# Patient Record
Sex: FEMALE | Race: WHITE | NOT HISPANIC OR LATINO | Employment: FULL TIME | URBAN - METROPOLITAN AREA
[De-identification: names, ages, dates, MRNs, and addresses within clinical notes are randomized per-mention and may not be internally consistent; named-entity substitution may affect disease eponyms.]

---

## 2019-02-05 ENCOUNTER — OFFICE VISIT (OUTPATIENT)
Dept: ORTHOPEDIC SURGERY | Facility: CLINIC | Age: 44
End: 2019-02-05

## 2019-02-05 VITALS — HEART RATE: 80 BPM | BODY MASS INDEX: 44.81 KG/M2 | OXYGEN SATURATION: 98 % | HEIGHT: 65 IN | WEIGHT: 268.96 LBS

## 2019-02-05 DIAGNOSIS — M25.562 LEFT KNEE PAIN, UNSPECIFIED CHRONICITY: Primary | ICD-10-CM

## 2019-02-05 DIAGNOSIS — M17.12 PRIMARY OSTEOARTHRITIS OF LEFT KNEE: ICD-10-CM

## 2019-02-05 PROCEDURE — 20610 DRAIN/INJ JOINT/BURSA W/O US: CPT | Performed by: ORTHOPAEDIC SURGERY

## 2019-02-05 PROCEDURE — 99214 OFFICE O/P EST MOD 30 MIN: CPT | Performed by: ORTHOPAEDIC SURGERY

## 2019-02-05 RX ORDER — TRIAMCINOLONE ACETONIDE 40 MG/ML
40 INJECTION, SUSPENSION INTRA-ARTICULAR; INTRAMUSCULAR
Status: COMPLETED | OUTPATIENT
Start: 2019-02-05 | End: 2019-02-05

## 2019-02-05 RX ORDER — PROPRANOLOL HYDROCHLORIDE 80 MG/1
80 TABLET ORAL 3 TIMES DAILY
COMMUNITY
End: 2021-12-23

## 2019-02-05 RX ORDER — CITALOPRAM 10 MG/1
10 TABLET ORAL DAILY
COMMUNITY
End: 2021-06-14

## 2019-02-05 RX ORDER — LIDOCAINE HYDROCHLORIDE 10 MG/ML
3 INJECTION, SOLUTION INFILTRATION; PERINEURAL
Status: COMPLETED | OUTPATIENT
Start: 2019-02-05 | End: 2019-02-05

## 2019-02-05 RX ADMIN — TRIAMCINOLONE ACETONIDE 40 MG: 40 INJECTION, SUSPENSION INTRA-ARTICULAR; INTRAMUSCULAR at 15:42

## 2019-02-05 RX ADMIN — LIDOCAINE HYDROCHLORIDE 3 ML: 10 INJECTION, SOLUTION INFILTRATION; PERINEURAL at 15:42

## 2019-02-05 NOTE — PROGRESS NOTES
[facility] [place] Orthopaedic Surgery Clinic Note    Subjective     Pain of the Left Knee (Many year history of (L) knee pain- has increased in nature in last month- pain scale 4/10 today- mod factors- activity mod, NSAIDs, ice, elevation)      DAWSON Oseguera is a 43 y.o. female.  Patient returns the office today for her left knee.  It's been quite some time since we have seen her for the left knee.  She's complaining of medial sided knee pain and superior lateral knee pain.  It is sharp and unpredictable in nature.  No mechanical disturbance.  She has used anti-inflammatories, activity modification, ice, and elevation.  She has seen Dr. Francis in the past as well as myself.     Past Medical History:   Diagnosis Date   • Diabetes (CMS/[facility])    • Hypertension       Past Surgical History:   Procedure Laterality Date   • SHOULDER SURGERY Left       Family History   Problem Relation Age of Onset   • Stroke Mother    • Osteoarthritis Mother    • Hypertension Mother      Social History     Socioeconomic History   • Marital status:      Spouse name: Not on file   • Number of children: Not on file   • Years of education: Not on file   • Highest education level: Not on file   Social Needs   • Financial resource strain: Not on file   • Food insecurity - worry: Not on file   • Food insecurity - inability: Not on file   • Transportation needs - medical: Not on file   • Transportation needs - non-medical: Not on file   Occupational History   • Not on file   Tobacco Use   • Smoking status: Never Smoker   • Smokeless tobacco: Never Used   Substance and Sexual Activity   • Alcohol use: No     Frequency: Never   • Drug use: No   • Sexual activity: Defer   Other Topics Concern   • Not on file   Social History Narrative   • Not on file      Current Outpatient Medications on File Prior to Visit   Medication Sig Dispense Refill   • citalopram (CeleXA) 10 MG tablet Take 10 mg by mouth Daily.     • metFORMIN (GLUCOPHAGE) 1000 MG tablet  "Take 1,000 mg by mouth 2 (Two) Times a Day With Meals.     • propranolol (INDERAL) 80 MG tablet Take 80 mg by mouth 3 (Three) Times a Day.       No current facility-administered medications on file prior to visit.       No Known Allergies     The following portions of the patient's history were reviewed and updated as appropriate: allergies, current medications, past family history, past medical history, past social history, past surgical history and problem list.    Review of Systems   Constitutional: Negative.    HENT: Negative.    Eyes: Negative.    Respiratory: Negative.    Cardiovascular: Positive for leg swelling.   Gastrointestinal: Negative.    Endocrine: Negative.    Genitourinary: Negative.    Musculoskeletal: Positive for arthralgias and joint swelling.   Skin: Negative.    Allergic/Immunologic: Negative.    Neurological: Negative.    Hematological: Negative.    Psychiatric/Behavioral: Positive for decreased concentration.        Objective      Physical Exam  Pulse 80   Ht 165.1 cm (65\")   Wt 122 kg (268 lb 15.4 oz)   SpO2 98%   BMI 44.76 kg/m²     Body mass index is 44.76 kg/m².    General  Mental Status - alert  General Appearance - cooperative, well groomed, not in acute distress  Orientation - Oriented X3  Build & Nutrition - well developed and well nourished  Posture - normal posture  Gait - normal gait     Integumentary  Global Assessment  Examination of related systems reveals - no lymphadenopathy  Ears:  No abnormality  Nose:  No mucous drainage  General Characteristics  Overall examination of the patient's skin reveals - no rashes, no evidence of scars, no suspicious lesions and no bruises.  Color - normal coloration of skin.  Vascular: Brisk capillary refill in all extremities    Ortho Exam  Peripheral Vascular:    Upper Extremity:   Inspection:  Left--no cyanotic nail beds Right--no cyanotic nail beds   Bilateral:  Pink nail beds with brisk capillary refill   Palpation:  Bilateral radial " pulse normal    Musculoskeletal:  Global Assessment:  Overall assessment of Lower Extremity Muscle Strength and Tone:  Left quadriceps--5/5   Left hamstrings--5/5       Left tibialis anterior--5/5  Left gastroc-soleus--5/5  Left EHL --5/5    Lower Extremity:  Knee/Patella:  No digital clubbing or cyanosis.    Examination of left knee reveals:  Normal deep tendon reflexes, coordination, strength, tone, sensation.  No known fractures or deformities.    Inspection and Palpation:  Left knee:  Tenderness:  Over the medial joint line and moderate severity  Effusion:  1+  Crepitus:  Positive  Pulses:  2+  Ecchymosis:  None  Warmth:  None     ROM:  Right:  Extension: 5    Flexion: 120   Left:  Extension: 5     Flexion:120    Instability:    Left:  Lachman Test:  Negative, Varus stress test negative, Valgus stress test negative    Deformities/Malalignments/Discrepancies:    Left:  Genu Varum   Right:  No deformity    Functional Testing:  Aubree's test:  Negative  Patella grind test:  Positive  Q-angle:  normal    Imaging/Studies    Imaging Results (last 24 hours)     Procedure Component Value Units Date/Time    XR Knee 4+ View Left [[id number]] Resulted:  02/05/19 1524     Updated:  02/05/19 1525    Narrative:       Knee X-Ray    Indication: Pain    Study:  Upright AP, Skiers, Lateral, and Sunrise views of Left knee(s)    Comparison: None    Findings:    Patient appears to have moderate to early severe degenerative changes in   the medial compartment most clearly demonstrated on the skiers view.    There are minimal degenerative changes in the lateral compartment.  There   are moderate changes in the patellofemoral compartment.  Patient has   overall varus alignment.      Impression: Moderate medial compartment and patellofemoral compartment   osteoarthritis of the left knee.              Assessment:  1. Left knee pain, unspecified chronicity    2. Primary osteoarthritis of left knee        Plan:  1. Continue  over-the-counter medication as needed for discomfort  2. Left knee will be injected with corticosteroid today  3. Custom medial compartment offloading brace will be prescribed and fitted today  4. Follow-up in 8 weeks we'll see how she is doing overall.  With amalia, is doubtful that viscosupplementation can be approved.      Medical Decision Making  Management Options : over-the-counter medicine and prescription/IM medicine  Data/Risk: radiology tests    Rolo Vale MD  02/05/19  5:50 PM

## 2019-02-05 NOTE — PROGRESS NOTES
Procedure   Large Joint Arthrocentesis: L knee  Date/Time: 2/5/2019 3:42 PM  Consent given by: patient  Site marked: site marked  Timeout: Immediately prior to procedure a time out was called to verify the correct patient, procedure, equipment, support staff and site/side marked as required   Supporting Documentation  Indications: pain   Procedure Details  Location: knee - L knee  Preparation: Patient was prepped and draped in the usual sterile fashion  Needle size: 25 G  Approach: anterolateral  Medications administered: 3 mL lidocaine 1 %; 40 mg triamcinolone acetonide 40 MG/ML  Patient tolerance: patient tolerated the procedure well with no immediate complications

## 2019-02-12 ENCOUNTER — TELEPHONE (OUTPATIENT)
Dept: ORTHOPEDIC SURGERY | Facility: CLINIC | Age: 44
End: 2019-02-12

## 2019-02-12 NOTE — TELEPHONE ENCOUNTER
Jonathon called to let the patient know about the deductible on the custom knee brace. Patient stated that the cortisone shots were helping a bit and would wait until the next appointment.

## 2019-04-18 ENCOUNTER — TELEPHONE (OUTPATIENT)
Dept: ORTHOPEDIC SURGERY | Facility: CLINIC | Age: 44
End: 2019-04-18

## 2019-06-25 ENCOUNTER — OFFICE VISIT (OUTPATIENT)
Dept: ORTHOPEDIC SURGERY | Facility: CLINIC | Age: 44
End: 2019-06-25

## 2019-06-25 VITALS — HEIGHT: 65 IN | WEIGHT: 268.3 LBS | BODY MASS INDEX: 44.7 KG/M2 | OXYGEN SATURATION: 98 % | HEART RATE: 82 BPM

## 2019-06-25 DIAGNOSIS — G89.29 CHRONIC PAIN OF LEFT KNEE: Primary | ICD-10-CM

## 2019-06-25 DIAGNOSIS — M17.12 PRIMARY OSTEOARTHRITIS OF LEFT KNEE: ICD-10-CM

## 2019-06-25 DIAGNOSIS — M25.562 CHRONIC PAIN OF LEFT KNEE: Primary | ICD-10-CM

## 2019-06-25 PROCEDURE — 20610 DRAIN/INJ JOINT/BURSA W/O US: CPT | Performed by: PHYSICIAN ASSISTANT

## 2019-06-25 PROCEDURE — 99214 OFFICE O/P EST MOD 30 MIN: CPT | Performed by: PHYSICIAN ASSISTANT

## 2019-06-25 RX ADMIN — TRIAMCINOLONE ACETONIDE 40 MG: 40 INJECTION, SUSPENSION INTRA-ARTICULAR; INTRAMUSCULAR at 11:55

## 2019-06-25 RX ADMIN — LIDOCAINE HYDROCHLORIDE 4 ML: 10 INJECTION, SOLUTION EPIDURAL; INFILTRATION; INTRACAUDAL; PERINEURAL at 11:55

## 2019-06-25 NOTE — PROGRESS NOTES
Procedure   Large Joint Arthrocentesis: L knee  Date/Time: 6/25/2019 11:55 AM  Consent given by: patient  Site marked: site marked  Timeout: Immediately prior to procedure a time out was called to verify the correct patient, procedure, equipment, support staff and site/side marked as required   Supporting Documentation  Indications: pain   Procedure Details  Location: knee - L knee  Preparation: Patient was prepped and draped in the usual sterile fashion  Needle size: 22 G  Approach: anterolateral  Medications administered: 4 mL lidocaine PF 1% 1 %; 40 mg triamcinolone acetonide 40 MG/ML  Patient tolerance: patient tolerated the procedure well with no immediate complications

## 2019-06-25 NOTE — PROGRESS NOTES
"    [facility] [place] Orthopa[facility] Surgery Clinic Note    Subjective     CC: Follow-up (4 month follow up - Primary osteoarthritis of left knee - injection given 02/05/19)      DAWSON Oseguera is a 43 y.o. female.  Patient presents for follow-up evaluation of her left knee.  She states that the corticosteroid injection she was given when she saw Dr. Vale in February lasted for approximately 1 month and then wore off.  She is traveling to [facility] next week and would like to repeat the injection if possible for pain control during that trip.  At this time she endorses pain scale of 5/10.  Severity the pain moderate.  Quality the pain stabbing, shooting.  Associated symptoms swelling, popping, stiffness, giving away.  Symptoms are worse with any type of walking, standing, stair climbing.  She is currently using ibuprofen as needed for pain control.  Patient describes the pain as sharp to the anterior medial aspect of the knee with achiness noted laterally.  No reported numbness or tingling into the extremity.  No mechanical symptoms noted.    ROS:    Constiutional:Pt denies fever, chills, nausea, or vomiting.  MSK:as above    Objective      Past Medical History  Past Medical History:   Diagnosis Date   • Diabetes (CMS/[facility])    • Hypertension          Physical Exam  Pulse 82   Ht 165.1 cm (65\")   Wt 122 kg (268 lb 4.8 oz)   SpO2 98%   BMI 44.65 kg/m²     Body mass index is 44.65 kg/m².    Patient is well nourished and well developed.        Ortho Exam  Peripheral Vascular:    Upper Extremity:   Inspection:  Left--no cyanotic nail beds Right--no cyanotic nail beds   Bilateral:  Pink nail beds with brisk capillary refill   Palpation:  Bilateral radial pulse normal    Musculoskeletal:  Global Assessment:  Overall assessment of Lower Extremity Muscle Strength and Tone:  Left quadriceps--5/5   Left hamstrings--5/5       Left tibialis anterior--5/5  Left gastroc-soleus--5/5  Left EHL--5/5      Lower Extremity:  Knee/Patella:  " No digital clubbing or cyanosis.    Examination of left knee reveals:  Normal deep tendon reflexes, coordination, strength, tone, sensation.  No known fractures or deformities.    Inspection and Palpation:    Left knee:  Tenderness: Positive medial greater than lateral joint line tenderness.  Effusion: 1+  Crepitus: Positive  Pulses:  2+  Ecchymosis:  None  Warmth:  None       ROM:  Right:  Extension:0    Flexion:120  Left:  Extension:0     Flexion:120    Instability:    Left:  Lachman Test:  Negative, Varus stress test negative, Valgus stress test negative   Anterior Drawer Test:  Negative, Posterior Drawer Test:  Negative      Deformities/Malalignments/Discrepancies:    Left: Genu varum  Right:  Genu varum    Functional Testing:  Left:  Aubree's test:  Negative  Patella grind test: Positive      Imaging/Labs/EMG Reviewed:    Imaging Results (last 24 hours)     ** No results found for the last 24 hours. **      None today.    Assessment:  1. Chronic pain of left knee    2. Primary osteoarthritis of left knee        Plan:  1. Chronic left knee pain with known osteoarthritis.  2. We discussed the use of Visco supplementation which she is amenable to.  Preauthorization was placed today.  Pending insurance patient might have to obtain from [facility].  3. For immediate relief I did offer repeat corticosteroid injection.  Injection was given today.  4. Patient had been fitted previously for custom-made medial off  brace however due to insurance reasons she could not afford it and therefore declined.  5. Continue with over-the-counter pain medication as needed.  6. Follow-up after Visco supplementation preauthorization obtained.  7. Questions and concerns answered.    After discussing the risks, benefits, indications of injection, the patient gave consent to proceed.  Her left knee was confirmed as the correct joint to be injected with a timeout.  It was then prepped using Hibiclens and injected with a  mixture of 4 cc of 1% plain lidocaine and 1 cc of Kenalog (40 mg per mL) without any resistance through the anterior lateral approach, patient in seated position.  Area was cleaned, hemostasis was achieved and a Band-Aid was applied over the injection site.  The patient tolerated procedure well.  I instructed the patient on signs and symptoms of infection.  They should report to the emergency department or return to clinic if any of these develop, for further evaluation and treatment.  Recommended modifying activity for the next 48 hours to include rest, ice, elevation and oral pain medication as needed.  Patient was observed ambulating normally after the injection.    Medical Decision Making  Management Options : over-the-counter medicine and prescription/IM medicine      Mellisa Yoo PA-C  06/25/19  10:51 PM

## 2019-06-28 RX ORDER — TRIAMCINOLONE ACETONIDE 40 MG/ML
40 INJECTION, SUSPENSION INTRA-ARTICULAR; INTRAMUSCULAR
Status: COMPLETED | OUTPATIENT
Start: 2019-06-25 | End: 2019-06-25

## 2019-06-28 RX ORDER — LIDOCAINE HYDROCHLORIDE 10 MG/ML
4 INJECTION, SOLUTION EPIDURAL; INFILTRATION; INTRACAUDAL; PERINEURAL
Status: COMPLETED | OUTPATIENT
Start: 2019-06-25 | End: 2019-06-25

## 2021-06-14 ENCOUNTER — OFFICE VISIT (OUTPATIENT)
Dept: ORTHOPEDIC SURGERY | Facility: CLINIC | Age: 46
End: 2021-06-14

## 2021-06-14 VITALS
WEIGHT: 261 LBS | SYSTOLIC BLOOD PRESSURE: 187 MMHG | BODY MASS INDEX: 43.49 KG/M2 | HEART RATE: 75 BPM | DIASTOLIC BLOOD PRESSURE: 110 MMHG | HEIGHT: 65 IN

## 2021-06-14 DIAGNOSIS — M79.602 LEFT ARM PAIN: Primary | ICD-10-CM

## 2021-06-14 DIAGNOSIS — S56.912A MUSCLE STRAIN OF LEFT FOREARM, INITIAL ENCOUNTER: ICD-10-CM

## 2021-06-14 DIAGNOSIS — E66.01 CLASS 3 SEVERE OBESITY DUE TO EXCESS CALORIES WITHOUT SERIOUS COMORBIDITY WITH BODY MASS INDEX (BMI) OF 40.0 TO 44.9 IN ADULT (HCC): ICD-10-CM

## 2021-06-14 PROCEDURE — 99214 OFFICE O/P EST MOD 30 MIN: CPT | Performed by: PHYSICIAN ASSISTANT

## 2021-06-14 RX ORDER — CITALOPRAM 40 MG/1
40 TABLET ORAL DAILY
COMMUNITY
Start: 2021-04-02

## 2021-06-14 RX ORDER — LOSARTAN POTASSIUM 100 MG/1
100 TABLET ORAL DAILY
COMMUNITY

## 2021-06-14 NOTE — PROGRESS NOTES
[facility] [place] [facility]opa[facility] Surgery Clinic Note    Subjective     Chief Complaint   Patient presents with   • Left Arm - Initial Evaluation        HPI  Luz Oseguera is a 45 y.o. female.  Established patient presents with new issue of left dorsal forearm pain following a jerking type injury approximately 6 weeks ago.  She states she was lifting a child out of the car and had to jerk the child towards her as the door was closing resulting in pain along the dorsum of the forearm.    At this time she endorses a pain scale 6/10.  Severity the pain moderate.  Quality pain burning, stabbing.  Associated symptoms stiffness with some intermittent tingling.  Pain is worse with any type of movement of the forearm.  She is currently taking ibuprofen and Tylenol.    No reported fever, chills, night sweats or other constitutional symptoms.    Past Medical History:   Diagnosis Date   • Diabetes (CMS/HCC)    • Hypertension       Past Surgical History:   Procedure Laterality Date   • SHOULDER SURGERY Left       Family History   Problem Relation Age of Onset   • Stroke Mother    • Osteoarthritis Mother    • Hypertension Mother      Social History     Socioeconomic History   • Marital status:      Spouse name: Not on file   • Number of children: Not on file   • Years of education: Not on file   • Highest education level: Not on file   Tobacco Use   • Smoking status: Never Smoker   • Smokeless tobacco: Never Used   Substance and Sexual Activity   • Alcohol use: No   • Drug use: No   • Sexual activity: Defer      Current Outpatient Medications on File Prior to Visit   Medication Sig Dispense Refill   • citalopram (CeleXA) 40 MG tablet Take 40 mg by mouth Daily.     • losartan (COZAAR) 100 MG tablet Take 100 mg by mouth Daily.     • metFORMIN (GLUCOPHAGE) 1000 MG tablet Take 1,000 mg by mouth 2 (Two) Times a Day With Meals.     • propranolol (INDERAL) 80 MG tablet Take 80 mg by mouth 3 (Three) Times a Day.       No current  "facility-administered medications on file prior to visit.      No Known Allergies     The following portions of the patient's history were reviewed and updated as appropriate: allergies, current medications, past family history, past medical history, past social history, past surgical history and problem list.    Review of Systems   Constitutional: Negative.    HENT: Negative.    Eyes: Negative.    Respiratory: Negative.    Cardiovascular: Positive for leg swelling.   Gastrointestinal: Negative.    Endocrine: Negative.    Genitourinary: Negative.    Musculoskeletal: Positive for arthralgias and joint swelling.   Skin: Negative.    Allergic/Immunologic: Negative.    Neurological: Negative.    Hematological: Negative.    Psychiatric/Behavioral: Negative.         Objective      Physical Exam  BP (!) 187/110   Pulse 75   Ht 165.1 cm (65\")   Wt 118 kg (261 lb)   BMI 43.43 kg/m²     Body mass index is 43.43 kg/m².    GENERAL APPEARANCE: awake, alert & oriented x 3, in no acute distress and well developed, well nourished  PSYCH: normal mood and affect  LUNGS:  breathing nonlabored, no wheezing  EYES: sclera anicteric, pupils equal  CARDIOVASCULAR: palpable pulses. Capillary refill less than 2 seconds  INTEGUMENTARY: skin intact, no clubbing, cyanosis  NEUROLOGIC:  Normal Sensation         Ortho Exam  Peripheral Vascular   Left Upper Extremity    No cyanotic nail beds    Pink nail beds and rapid capillary refill   Palpation    Radial Pulse - Bilaterally normal    Neurologic   Sensory - Elbow   Inspection and Palpation:    Light touch: intact - right hand   Muscle Strength and Tone:    Left bicep - 5/5    Left tricep - 5/5    Left wrist extensors - 4+/5     Left wrist flexors - 5/5    Left intrinsics - 5/5    Musculoskeletal   Left Upper Extremity - Elbow   Inspection and Palpation     Tenderness -positive tenderness noted along dorsum of forearm.  No pain along lateral epicondyle.  No pain medially to the " elbow.    Effusion - none    Crepitus - none   Range of Motion    Flexion: AROM - 140 degrees    Extension - AROM - 0 degrees     Forearm supination: AROM - 65 degrees with increased pain    Forearm pronation - AROM - 90 degrees   Instability    Left - tennis elbow test negative   Deformities/Malalignments/Discepancies - none   Functional testing:    Valgus stress test negative    Varus stress test negative    Elbow flexion test negative    Tinel's sign at Cubital tunnel negative      Imaging/Studies  Ordered left elbow plain films.  Imaging read by Dr. Oseguera.    Imaging Results (Last 7 Days)     Procedure Component Value Units Date/Time    XR Elbow 3+ View Left [[id number]] Resulted: 06/14/21 1112     Updated: 06/14/21 1113    Narrative:      Left Elbow X-Ray  Indication: Pain  Views: AP, oblique and Lateral views    Findings:  No fracture  No bony lesion  Normal soft tissues  Normal joint spaces    No prior studies were available for comparison.              Assessment/Plan        ICD-10-CM ICD-9-CM   1. Left arm pain  M79.602 729.5   2. Muscle strain of left forearm, initial encounter  S56.912A 841.9   3. Class 3 severe obesity due to excess calories without serious comorbidity with body mass index (BMI) of 40.0 to 44.9 in adult (CMS/[facility])  E66.01 278.01    Z68.41 V85.41       Orders Placed This Encounter   Procedures   • XR Elbow 3+ View Left   • Ambulatory Referral to Physical Therapy Evaluate and treat, Ortho   • Ambulatory Referral to Bariatric Surgery        -Left forearm pain due to strain--patient was referred to formal PT to work on inflammation/pain control, range of motion, stretching and strengthening.  -Patient may continue use of ibuprofen and Tylenol for pain control.  -Obesity--patient has a BMI of 43.43.  The patient has been instructed on various weight loss avenues including diet, portion control, calorie restriction, low/no impact exercise, referral to weight loss management and/or bariatric  surgery.  It was explained that weight loss can improve joint pain alone by decreasing the joint reaction forces.  For every pound of weight change, the knee and hip joints see a 4 to 5 fold change in pressure.  Patient was provided a prescription/referral to bariatric surgery.  -Follow-up 6 weeks for repeat evaluation, sooner if issues arise or symptoms worsen/change.  Patient continues to have pain to this area then we discussed possible injection versus advanced imaging.  -Questions and concerns answered.    Case discussed with Dr. Oseguera who agrees with the above assessment and plan.      Medical Decision Making  Management Options : over-the-counter medicine and physical/occupational therapy  Data/Risk: radiology tests       Mellisa Yoo PA-C  06/17/21  13:37 EDT         EMR Dragon/Transcription disclaimer:  Much of this encounter note is an electronic transcription of spoken language to printed text. Electronic transcription of spoken language may permit erroneous, or at times, nonsensical words or phrases to be inadvertently transcribed. Although I have reviewed the note for such errors, some may still exist.

## 2021-07-28 ENCOUNTER — TELEPHONE (OUTPATIENT)
Dept: ORTHOPEDIC SURGERY | Facility: CLINIC | Age: 46
End: 2021-07-28

## 2021-12-23 ENCOUNTER — PREP FOR SURGERY (OUTPATIENT)
Dept: OTHER | Facility: HOSPITAL | Age: 46
End: 2021-12-23

## 2021-12-23 ENCOUNTER — OFFICE VISIT (OUTPATIENT)
Dept: ORTHOPEDIC SURGERY | Facility: CLINIC | Age: 46
End: 2021-12-23

## 2021-12-23 VITALS
HEIGHT: 65 IN | BODY MASS INDEX: 42.92 KG/M2 | WEIGHT: 257.6 LBS | DIASTOLIC BLOOD PRESSURE: 102 MMHG | SYSTOLIC BLOOD PRESSURE: 150 MMHG

## 2021-12-23 DIAGNOSIS — M25.562 PAIN IN BOTH KNEES, UNSPECIFIED CHRONICITY: ICD-10-CM

## 2021-12-23 DIAGNOSIS — E66.01 CLASS 3 SEVERE OBESITY DUE TO EXCESS CALORIES WITHOUT SERIOUS COMORBIDITY WITH BODY MASS INDEX (BMI) OF 40.0 TO 44.9 IN ADULT (HCC): ICD-10-CM

## 2021-12-23 DIAGNOSIS — M25.561 PAIN IN BOTH KNEES, UNSPECIFIED CHRONICITY: ICD-10-CM

## 2021-12-23 DIAGNOSIS — M17.12 PRIMARY OSTEOARTHRITIS OF LEFT KNEE: Primary | ICD-10-CM

## 2021-12-23 PROBLEM — M17.9 OA (OSTEOARTHRITIS) OF KNEE: Status: ACTIVE | Noted: 2021-12-23

## 2021-12-23 PROCEDURE — 99214 OFFICE O/P EST MOD 30 MIN: CPT | Performed by: ORTHOPAEDIC SURGERY

## 2021-12-23 RX ORDER — OXYCODONE HCL 10 MG/1
10 TABLET, FILM COATED, EXTENDED RELEASE ORAL ONCE
Status: CANCELLED | OUTPATIENT
Start: 2021-12-23 | End: 2021-12-23

## 2021-12-23 RX ORDER — ACETAMINOPHEN 500 MG
1000 TABLET ORAL ONCE
Status: CANCELLED | OUTPATIENT
Start: 2021-12-23 | End: 2021-12-23

## 2021-12-23 RX ORDER — CEFAZOLIN SODIUM IN 0.9 % NACL 3 G/100 ML
3 INTRAVENOUS SOLUTION, PIGGYBACK (ML) INTRAVENOUS ONCE
Status: CANCELLED | OUTPATIENT
Start: 2021-12-23 | End: 2021-12-23

## 2021-12-23 RX ORDER — AMLODIPINE BESYLATE 5 MG/1
5 TABLET ORAL DAILY
COMMUNITY
Start: 2021-11-24

## 2021-12-23 RX ORDER — PROPRANOLOL HYDROCHLORIDE 160 MG/1
160 CAPSULE, EXTENDED RELEASE ORAL
COMMUNITY
Start: 2021-11-24

## 2021-12-23 RX ORDER — CHLORHEXIDINE GLUCONATE 4 G/100ML
SOLUTION TOPICAL DAILY
Qty: 236 ML | Refills: 0 | Status: SHIPPED | OUTPATIENT
Start: 2021-12-23 | End: 2022-05-04 | Stop reason: HOSPADM

## 2021-12-23 NOTE — PROGRESS NOTES
"    [facility] [place] [facility] Note        Subjective     CC: Pain of the Right Knee and Follow-up (2 years follow up Primary osteoarthritis of left knee )      DAWSON Oseguera is a 46 y.o. female who presents with new problem of: right knee pain.  Onset: atraumatic and gradual in nature. The issue has been ongoing for 6 month(s). Pain is a 3/10 on the pain scale. Pain is described as sharp. Associated symptoms include pain. The pain is worse with walking; resting improve the pain. Previous treatments have included: nothing.    I have reviewed the following portions of the patient's history:History of Present Illness and review of systems.      Patient is here today for follow-up of her left knee and also for new problem day regarding her right knee.  She feels strongly that her right knee pain is being caused by compensation for her bad left knee.  She says she cannot be as active as she would like to be.  She is now an  in the school system rather than a teacher.  She has a grandchild coming in the spring.    ROS:    Constiutional:Pt denies fever, chills, nausea, or vomiting.  MSK:as above        Objective      Past Medical History  Past Medical History:   Diagnosis Date   • Diabetes (HCC)    • Hypertension          Physical Exam  BP (!) 150/102   Ht 165.1 cm (65\")   Wt 117 kg (257 lb 9.6 oz)   BMI 42.87 kg/m²     Body mass index is 42.87 kg/m².    Patient is well nourished and well developed.        Ortho Exam      Musculoskeletal:  Global Assessment:  Overall assessment of Lower Extremity Muscle Strength and Tone:  Left quadriceps--5/5   Left hamstrings--5/5       Left tibialis anterior--5/5  Left gastroc-soleus--5/5  Left EHL --5/5    Lower Extremity:    Inspection and Palpation:  Left knee:  Tenderness:  Over the medial joint line and moderate severity  Effusion:  1+  Crepitus:  Positive  Pulses:  2+  Ecchymosis:  None  Warmth:  None     ROM:  Left:  Extension: 5     " Flexion:120    Instability:    Left:    Lachman Test:  Negative   Varus stress test negative  Valgus stress test negative    Deformities/Malalignments/Discrepancies:    Left:  Genu Varum     Functional Testing:  Aubree's test:  Negative  Patella grind test:  Positive  Q-angle:  normal            Musculoskeletal:  Global Assessment:  Overall assessment of Lower Extremity Muscle Strength and Tone:  Right quadriceps--5/5   Right hamstrings--5/5       Right tibialis anterior--5/5  Right gastroc-soleus--5/5  Right EHL --5/5    Lower Extremity:    Inspection and Palpation:  Right knee:  Tenderness:  Over the medial joint line and moderate severity  Effusion:  1+  Crepitus:  Positive  Pulses:  2+  Ecchymosis:  None  Warmth:  None     ROM:  Right:  Extension: 5    Flexion:120    Instability:    Right:  Lachman Test:  Negative   Varus stress test negative   Valgus stress test negative    Deformities/Malalignments/Discrepancies:    Left:  No deformities   Right:  Genu Varum    Functional Testing:  Aubree's test:  Negative  Patella grind test:  Positive  Q-angle:  normal          Imaging/Labs/EMG Reviewed:  Imaging Results (Last 24 Hours)     Procedure Component Value Units Date/Time    XR Knee 4+ View Bilateral [[id number]] Resulted: 12/23/21 1227     Updated: 12/23/21 1229    Narrative:      Right knee X-Ray    Indication: Pain    Study:  Upright AP, Skiers, Lateral, and Sunrise views of Right knee(s)    Comparison: None    Findings:    Patient appears to have mild degenerative changes in the medial   compartment.    There are mild early moderate degenerative changes in the lateral   compartment.    There are moderate to early severe changes in the patellofemoral   compartment.    Patient has overall varus alignment.    Kellgren-Everett stGstrstastdstest:st st1st Impression:   Mild medial compartment and mild to early moderate lateral and moderate to   early severe patellofemoral compartment degnerative changes of the knee            Knee X-Ray    Indication: Pain    Study:  Upright AP, Skiers, Lateral, and Sunrise views of Left knee(s)    Comparison: Left knee 2/5/2019    Findings:    Patient appears to have severe degenerative changes in the medial   compartment.    There are mild degenerative changes in the lateral compartment.    There are moderate changes in the patellofemoral compartment.    Patient has overall varus alignment.    Kellgren-Everett thGthrthathdtheth:th th5th Impression:   Severe medial compartment and moderate patellofemoral compartment   degnerative changes of the knee             Assessment    Assessment:  1. Primary osteoarthritis of left knee    2. Class 3 severe obesity due to excess calories without serious comorbidity with body mass index (BMI) of 40.0 to 44.9 in adult (HCC)    3. Pain in both knees, unspecified chronicity        Plan:  1. Recommend over the counter anti-inflammatories for pain and/or swelling  2. Osteoarthritis right knee--radiographically, patient has mild to moderate degenerative changes only.  Relatively good joint space preservation overall.  Agree with her assessment that this is likely compensatory pain.  Observe for now.  3. End-stage left knee arthritis--we had lengthy discussion with the patient regarding treatment options and alternatives.  At this point, with her grandchild coming, she would like to be more active.  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  The risk, benefits, potential hazards, typical recovery and rehab as well as reasonable alternatives to left total knee arthroplasty were discussed with her.  Patient will be done as an outpatient.  Full dose aspirin for 6 weeks will be used for DVT prophylaxis.  Immediate outpatient PT in [place] will be arranged at her preop appointment.  See her back a few days prior to surgery.      Rolo Vale MD  12/23/21  16:55  EST      Dictated Utilizing Dragon Dictation.

## 2022-01-13 ENCOUNTER — OFFICE VISIT (OUTPATIENT)
Dept: ORTHOPEDIC SURGERY | Facility: CLINIC | Age: 47
End: 2022-01-13

## 2022-01-13 ENCOUNTER — PRE-ADMISSION TESTING (OUTPATIENT)
Dept: PREADMISSION TESTING | Facility: HOSPITAL | Age: 47
End: 2022-01-13

## 2022-01-13 VITALS
WEIGHT: 257 LBS | SYSTOLIC BLOOD PRESSURE: 142 MMHG | HEIGHT: 65 IN | BODY MASS INDEX: 42.82 KG/M2 | DIASTOLIC BLOOD PRESSURE: 82 MMHG

## 2022-01-13 DIAGNOSIS — E66.01 CLASS 3 SEVERE OBESITY DUE TO EXCESS CALORIES WITHOUT SERIOUS COMORBIDITY WITH BODY MASS INDEX (BMI) OF 40.0 TO 44.9 IN ADULT: ICD-10-CM

## 2022-01-13 DIAGNOSIS — M17.12 PRIMARY OSTEOARTHRITIS OF LEFT KNEE: Primary | ICD-10-CM

## 2022-01-13 DIAGNOSIS — M17.12 PRIMARY OSTEOARTHRITIS OF LEFT KNEE: ICD-10-CM

## 2022-01-13 LAB
ANION GAP SERPL CALCULATED.3IONS-SCNC: 10 MMOL/L (ref 5–15)
BASOPHILS # BLD AUTO: 0.02 10*3/MM3 (ref 0–0.2)
BASOPHILS NFR BLD AUTO: 0.4 % (ref 0–1.5)
BUN SERPL-MCNC: 13 MG/DL (ref 6–20)
BUN/CREAT SERPL: 17.8 (ref 7–25)
CALCIUM SPEC-SCNC: 9.8 MG/DL (ref 8.6–10.5)
CHLORIDE SERPL-SCNC: 101 MMOL/L (ref 98–107)
CO2 SERPL-SCNC: 26 MMOL/L (ref 22–29)
CREAT SERPL-MCNC: 0.73 MG/DL (ref 0.57–1)
CRP SERPL-MCNC: 0.62 MG/DL (ref 0–0.5)
DEPRECATED RDW RBC AUTO: 38.5 FL (ref 37–54)
EOSINOPHIL # BLD AUTO: 0.13 10*3/MM3 (ref 0–0.4)
EOSINOPHIL NFR BLD AUTO: 2.7 % (ref 0.3–6.2)
ERYTHROCYTE [DISTWIDTH] IN BLOOD BY AUTOMATED COUNT: 11.9 % (ref 12.3–15.4)
ERYTHROCYTE [SEDIMENTATION RATE] IN BLOOD: 30 MM/HR (ref 0–20)
GFR SERPL CREATININE-BSD FRML MDRD: 85 ML/MIN/1.73
GLUCOSE SERPL-MCNC: 234 MG/DL (ref 65–99)
HBA1C MFR BLD: 9.6 % (ref 4.8–5.6)
HCT VFR BLD AUTO: 42.6 % (ref 34–46.6)
HGB BLD-MCNC: 14.1 G/DL (ref 12–15.9)
IMM GRANULOCYTES # BLD AUTO: 0.01 10*3/MM3 (ref 0–0.05)
IMM GRANULOCYTES NFR BLD AUTO: 0.2 % (ref 0–0.5)
LYMPHOCYTES # BLD AUTO: 1.77 10*3/MM3 (ref 0.7–3.1)
LYMPHOCYTES NFR BLD AUTO: 36.5 % (ref 19.6–45.3)
MCH RBC QN AUTO: 28.8 PG (ref 26.6–33)
MCHC RBC AUTO-ENTMCNC: 33.1 G/DL (ref 31.5–35.7)
MCV RBC AUTO: 87.1 FL (ref 79–97)
MONOCYTES # BLD AUTO: 0.41 10*3/MM3 (ref 0.1–0.9)
MONOCYTES NFR BLD AUTO: 8.5 % (ref 5–12)
NEUTROPHILS NFR BLD AUTO: 2.51 10*3/MM3 (ref 1.7–7)
NEUTROPHILS NFR BLD AUTO: 51.7 % (ref 42.7–76)
NRBC BLD AUTO-RTO: 0 /100 WBC (ref 0–0.2)
PLATELET # BLD AUTO: 259 10*3/MM3 (ref 140–450)
PMV BLD AUTO: 10 FL (ref 6–12)
POTASSIUM SERPL-SCNC: 4.3 MMOL/L (ref 3.5–5.2)
QT INTERVAL: 388 MS
QTC INTERVAL: 412 MS
RBC # BLD AUTO: 4.89 10*6/MM3 (ref 3.77–5.28)
SODIUM SERPL-SCNC: 137 MMOL/L (ref 136–145)
WBC NRBC COR # BLD: 4.85 10*3/MM3 (ref 3.4–10.8)

## 2022-01-13 PROCEDURE — S0260 H&P FOR SURGERY: HCPCS | Performed by: ORTHOPAEDIC SURGERY

## 2022-01-13 PROCEDURE — 80048 BASIC METABOLIC PNL TOTAL CA: CPT

## 2022-01-13 PROCEDURE — 86140 C-REACTIVE PROTEIN: CPT

## 2022-01-13 PROCEDURE — 36415 COLL VENOUS BLD VENIPUNCTURE: CPT

## 2022-01-13 PROCEDURE — 85025 COMPLETE CBC W/AUTO DIFF WBC: CPT

## 2022-01-13 PROCEDURE — 93010 ELECTROCARDIOGRAM REPORT: CPT | Performed by: INTERNAL MEDICINE

## 2022-01-13 PROCEDURE — 83036 HEMOGLOBIN GLYCOSYLATED A1C: CPT

## 2022-01-13 PROCEDURE — 85652 RBC SED RATE AUTOMATED: CPT

## 2022-01-13 PROCEDURE — 93005 ELECTROCARDIOGRAM TRACING: CPT

## 2022-01-13 NOTE — PROGRESS NOTES
"    [facility] [place]        Subjective     CC: Pre-op Exam (Primary osteoarthritis of left knee, Left TKA 1/26/22)      DAWSON Oseguera is a 46 y.o. female.  Patient is here for her preop appointment for left total knee scheduled tentatively for 1/26/2022.  She is eager to get this done.  She is flexible with regards to scheduling.    Overall, patient's symptoms are as above.    ROS:    Constiutional:Pt denies fever, chills, nausea, or vomiting.  MSK:as above        Objective      Past Medical History  Past Medical History:   Diagnosis Date   • Diabetes (HCC)    • Hypertension          Physical Exam  /82   Ht 165.1 cm (65\")   Wt 117 kg (257 lb)   BMI 42.77 kg/m²     Body mass index is 42.77 kg/m².    Patient is well nourished and well developed.        Ortho Exam      Musculoskeletal:  Global Assessment:  Overall assessment of Lower Extremity Muscle Strength and Tone:  Left quadriceps--5/5   Left hamstrings--5/5       Left tibialis anterior--5/5  Left gastroc-soleus--5/5  Left EHL --5/5    Lower Extremity:    Inspection and Palpation:  Left knee:  Tenderness:  Over the medial joint line and moderate severity  Effusion:  1+  Crepitus:  Positive  Pulses:  2+  Ecchymosis:  None  Warmth:  None     ROM:  Left:  Extension: 5     Flexion:120    Instability:    Left:    Lachman Test:  Negative   Varus stress test negative  Valgus stress test negative    Deformities/Malalignments/Discrepancies:    Left:  Genu Varum     Functional Testing:  Aubree's test:  Negative  Patella grind test:  Positive  Q-angle:  normal      Imaging/Labs/EMG Reviewed:  Imaging Results (Last 24 Hours)     ** No results found for the last 24 hours. **        Preop labs are pending which include CBC, BMP, hemoglobin A1c, sed rate and CRP.    Assessment    Assessment:  1. Primary osteoarthritis of left knee    2. Class 3 severe obesity due to excess calories without serious comorbidity with body mass index (BMI) of " 40.0 to 44.9 in adult (HCC)        Plan:  1. Recommend over the counter anti-inflammatories for pain and/or swelling  2. Severe left knee arthritis--pending successful completion of her labs and review, she would be an excellent candidate for arthroplasty.  We will try to get this done as an outpatient.  PT prescription written for her today.  Full dose aspirin daily for 6 weeks for DVT prophylaxis after surgery will be recommended.      Rolo Vale MD  01/13/22  17:49 EST      Dictated Utilizing Dragon Dictation.

## 2022-01-17 ENCOUNTER — APPOINTMENT (OUTPATIENT)
Dept: PREADMISSION TESTING | Facility: HOSPITAL | Age: 47
End: 2022-01-17

## 2022-01-23 ENCOUNTER — APPOINTMENT (OUTPATIENT)
Dept: PREADMISSION TESTING | Facility: HOSPITAL | Age: 47
End: 2022-01-23

## 2022-04-21 ENCOUNTER — PRE-ADMISSION TESTING (OUTPATIENT)
Dept: PREADMISSION TESTING | Facility: HOSPITAL | Age: 47
End: 2022-04-21

## 2022-04-21 ENCOUNTER — OFFICE VISIT (OUTPATIENT)
Dept: ORTHOPEDIC SURGERY | Facility: CLINIC | Age: 47
End: 2022-04-21

## 2022-04-21 VITALS — BODY MASS INDEX: 43.4 KG/M2 | WEIGHT: 260.47 LBS | HEIGHT: 65 IN

## 2022-04-21 VITALS — BODY MASS INDEX: 42.78 KG/M2 | HEIGHT: 65 IN | WEIGHT: 256.8 LBS

## 2022-04-21 DIAGNOSIS — E66.01 CLASS 3 SEVERE OBESITY DUE TO EXCESS CALORIES WITHOUT SERIOUS COMORBIDITY WITH BODY MASS INDEX (BMI) OF 40.0 TO 44.9 IN ADULT: ICD-10-CM

## 2022-04-21 DIAGNOSIS — R73.09 ELEVATED HEMOGLOBIN A1C: ICD-10-CM

## 2022-04-21 DIAGNOSIS — M17.12 PRIMARY OSTEOARTHRITIS OF LEFT KNEE: ICD-10-CM

## 2022-04-21 DIAGNOSIS — M17.12 PRIMARY OSTEOARTHRITIS OF LEFT KNEE: Primary | ICD-10-CM

## 2022-04-21 LAB
DEPRECATED RDW RBC AUTO: 39.7 FL (ref 37–54)
ERYTHROCYTE [DISTWIDTH] IN BLOOD BY AUTOMATED COUNT: 12.6 % (ref 12.3–15.4)
HBA1C MFR BLD: 6.6 % (ref 4.8–5.6)
HCT VFR BLD AUTO: 38.4 % (ref 34–46.6)
HGB BLD-MCNC: 13.1 G/DL (ref 12–15.9)
MCH RBC QN AUTO: 29.4 PG (ref 26.6–33)
MCHC RBC AUTO-ENTMCNC: 34.1 G/DL (ref 31.5–35.7)
MCV RBC AUTO: 86.3 FL (ref 79–97)
PLATELET # BLD AUTO: 273 10*3/MM3 (ref 140–450)
PMV BLD AUTO: 10 FL (ref 6–12)
POTASSIUM SERPL-SCNC: 4.3 MMOL/L (ref 3.5–5.2)
RBC # BLD AUTO: 4.45 10*6/MM3 (ref 3.77–5.28)
WBC NRBC COR # BLD: 7.24 10*3/MM3 (ref 3.4–10.8)

## 2022-04-21 PROCEDURE — 36415 COLL VENOUS BLD VENIPUNCTURE: CPT

## 2022-04-21 PROCEDURE — 84132 ASSAY OF SERUM POTASSIUM: CPT

## 2022-04-21 PROCEDURE — 85027 COMPLETE CBC AUTOMATED: CPT

## 2022-04-21 PROCEDURE — 83036 HEMOGLOBIN GLYCOSYLATED A1C: CPT

## 2022-04-21 PROCEDURE — S0260 H&P FOR SURGERY: HCPCS | Performed by: ORTHOPAEDIC SURGERY

## 2022-04-21 RX ORDER — PROCHLORPERAZINE 25 MG/1
SUPPOSITORY RECTAL
COMMUNITY
Start: 2022-02-11

## 2022-04-21 RX ORDER — GLIPIZIDE 5 MG/1
5 TABLET, FILM COATED, EXTENDED RELEASE ORAL DAILY
COMMUNITY
Start: 2022-03-14

## 2022-04-21 RX ORDER — PROCHLORPERAZINE 25 MG/1
SUPPOSITORY RECTAL DAILY
COMMUNITY
Start: 2022-02-14

## 2022-04-21 ASSESSMENT — KOOS JR
KOOS JR SCORE: 21
KOOS JR SCORE: 34.174

## 2022-04-21 NOTE — PROGRESS NOTES
"    [facility]        Subjective     CC: Pre-op Exam (Pre-op visit - Total knee Arthroplasty, Left 05/04 )      DAWSON Oseguera is a 46 y.o. female.  Patient returns the office today.  In the interim, she has gotten a Dexcom device and change medications and as a result her hemoglobin A1c is now less than 7.5.  She is eager to get her left knee replaced.    Overall, patient's symptoms are as above.    ROS:    Constiutional:Pt denies fever, chills, nausea, or vomiting.  MSK:as above        Objective      Past Medical History  Past Medical History:   Diagnosis Date   • Arthritis of neck April 2022    Diagnosed by chiropractor   • Diabetes (HCC)    • Hypertension    • Knee swelling Over 5 years ago         Physical Exam  Ht 165.1 cm (65\")   Wt 116 kg (256 lb 12.8 oz)   BMI 42.73 kg/m²     Body mass index is 42.73 kg/m².    Patient is well nourished and well developed.        Ortho Exam      Musculoskeletal:  Global Assessment:  Overall assessment of Lower Extremity Muscle Strength and Tone:  Left quadriceps--5/5   Left hamstrings--5/5       Left tibialis anterior--5/5  Left gastroc-soleus--5/5  Left EHL --5/5    Lower Extremity:    Inspection and Palpation:  Left knee:  Tenderness:  Over the medial joint line and moderate severity  Effusion:  1+  Crepitus:  Positive  Pulses:  2+  Ecchymosis:  None  Warmth:  None     ROM:  Left:  Extension: 5     Flexion:120    Instability:    Left:    Lachman Test:  Negative   Varus stress test negative  Valgus stress test negative    Deformities/Malalignments/Discrepancies:    Left:  Genu Varum     Functional Testing:  Aubree's test:  Negative  Patella grind test:  Positive  Q-angle:  normal      Imaging/Labs/EMG Reviewed:  Imaging Results (Last 24 Hours)     ** No results found for the last 24 hours. **            Assessment    Assessment:  1. Primary osteoarthritis of left knee    2. Class 3 severe obesity due to excess calories without serious " comorbidity with body mass index (BMI) of 40.0 to 44.9 in adult (HCC)    3. Elevated hemoglobin A1c        Plan:  1. Recommend over the counter anti-inflammatories for pain and/or swelling  2. Left knee arthritis--plan will be to provide her a PT prescription.  She is can have the surgery as an outpatient.  She will [facility].  We will use full dose aspirin daily for 6 weeks for DVT prophylaxis.  The risk, benefits, potential hazards of left total knee arthroplasty were discussed with her.  She had the opportunity ask questions and is eager to proceed with scheduling we will get this done early May.      Rolo Vale MD  04/21/22  17:24 EDT      Dictated Utilizing Dragon Dictation.

## 2022-04-21 NOTE — PAT
Discussed with patient options for receiving total joint replacement education and assessed patient's ability and preference. Joint Replacement Guide given to patient during PAT visit since not received a copy within the last year. Encouraged patient/family to read guide thoroughly and notify PAT staff with any questions or concerns. Handout provided directing patient to links to watch online videos related to joint replacement surgery on the [facility] website. The handout gives detailed instructions for joining an online joint replacement class through Zoom or phone conference offered on . Patient agreed to participate by watching videos online. Patient verbalized understanding of instructions and to complete the online learning tool survey. Encouraged to share information with family and/or . An overview of the joint replacement education was provided during the visit including general perioperative instructions that are routine for all surgical patients (PAT PASS, wipes, directions to pre-op, etc.).    Per Anesthesia Request, patient instructed not to take their ACE/ARB medications on the AM of surgery.    Bactroban and Chlorhexidine Prescription prescribed by physician before PAT visit.  Verified with patient that medications were picked up from their pharmacy.  Written instructions given to patient during PAT visit.  Patient/family also instructed to complete skin prep checklist and return the checklist on the day of surgery to preoperative staff.  Patient/family verbalized understanding.    Patient instructed to drink 20 ounces (or until full) of Gatorade and it needs to be completed 1 hour (for Main OR patients) or 2 hours (scheduled  section patients) before given arrival time for procedure (NO RED Gatorade)    Patient verbalized understanding.    Patient to apply Chlorhexadine wipes  to surgical area (as instructed) the night before procedure and the AM of procedure. Wipes  provided.    [facility]    EKG on chart from 1/13/22.

## 2022-05-02 ENCOUNTER — APPOINTMENT (OUTPATIENT)
Dept: PREADMISSION TESTING | Facility: HOSPITAL | Age: 47
End: 2022-05-02

## 2022-05-02 LAB — SARS-COV-2 RNA PNL SPEC NAA+PROBE: NOT DETECTED

## 2022-05-02 PROCEDURE — C9803 HOPD COVID-19 SPEC COLLECT: HCPCS

## 2022-05-02 PROCEDURE — U0004 COV-19 TEST NON-CDC HGH THRU: HCPCS

## 2022-05-03 ENCOUNTER — ANESTHESIA EVENT (OUTPATIENT)
Dept: PERIOP | Facility: HOSPITAL | Age: 47
End: 2022-05-03

## 2022-05-03 RX ORDER — FAMOTIDINE 10 MG/ML
20 INJECTION, SOLUTION INTRAVENOUS ONCE
Status: CANCELLED | OUTPATIENT
Start: 2022-05-03 | End: 2022-05-03

## 2022-05-04 ENCOUNTER — APPOINTMENT (OUTPATIENT)
Dept: GENERAL RADIOLOGY | Facility: HOSPITAL | Age: 47
End: 2022-05-04

## 2022-05-04 ENCOUNTER — ANESTHESIA EVENT CONVERTED (OUTPATIENT)
Dept: ANESTHESIOLOGY | Facility: HOSPITAL | Age: 47
End: 2022-05-04

## 2022-05-04 ENCOUNTER — HOSPITAL ENCOUNTER (OUTPATIENT)
Facility: HOSPITAL | Age: 47
Discharge: HOME | End: 2022-05-04
Attending: ORTHOPAEDIC SURGERY | Admitting: ORTHOPAEDIC SURGERY

## 2022-05-04 ENCOUNTER — ANESTHESIA (OUTPATIENT)
Dept: PERIOP | Facility: HOSPITAL | Age: 47
End: 2022-05-04

## 2022-05-04 VITALS
HEIGHT: 65 IN | TEMPERATURE: 98.2 F | OXYGEN SATURATION: 92 % | SYSTOLIC BLOOD PRESSURE: 108 MMHG | HEART RATE: 65 BPM | WEIGHT: 260.47 LBS | DIASTOLIC BLOOD PRESSURE: 71 MMHG | BODY MASS INDEX: 43.4 KG/M2 | RESPIRATION RATE: 17 BRPM

## 2022-05-04 DIAGNOSIS — M17.12 PRIMARY OSTEOARTHRITIS OF LEFT KNEE: ICD-10-CM

## 2022-05-04 LAB
B-HCG UR QL: NEGATIVE
EXPIRATION DATE: NORMAL
GLUCOSE BLDC GLUCOMTR-MCNC: 144 MG/DL (ref 70–130)
INTERNAL NEGATIVE CONTROL: NORMAL
INTERNAL POSITIVE CONTROL: NORMAL
Lab: NORMAL

## 2022-05-04 PROCEDURE — 25010000002 PROPOFOL 10 MG/ML EMULSION: Performed by: NURSE ANESTHETIST, CERTIFIED REGISTERED

## 2022-05-04 PROCEDURE — 97116 GAIT TRAINING THERAPY: CPT

## 2022-05-04 PROCEDURE — 27447 TOTAL KNEE ARTHROPLASTY: CPT | Performed by: ORTHOPAEDIC SURGERY

## 2022-05-04 PROCEDURE — 97161 PT EVAL LOW COMPLEX 20 MIN: CPT

## 2022-05-04 PROCEDURE — 25010000002 ROPIVACAINE PER 1 MG: Performed by: NURSE ANESTHETIST, CERTIFIED REGISTERED

## 2022-05-04 PROCEDURE — 73560 X-RAY EXAM OF KNEE 1 OR 2: CPT

## 2022-05-04 PROCEDURE — C1755 CATHETER, INTRASPINAL: HCPCS | Performed by: ORTHOPAEDIC SURGERY

## 2022-05-04 PROCEDURE — 25010000002 DEXAMETHASONE PER 1 MG: Performed by: NURSE ANESTHETIST, CERTIFIED REGISTERED

## 2022-05-04 PROCEDURE — 81025 URINE PREGNANCY TEST: CPT | Performed by: ANESTHESIOLOGY

## 2022-05-04 PROCEDURE — 25010000002 ROPIVACAINE PER 1 MG: Performed by: ORTHOPAEDIC SURGERY

## 2022-05-04 PROCEDURE — 82962 GLUCOSE BLOOD TEST: CPT

## 2022-05-04 PROCEDURE — 25010000002 KETOROLAC TROMETHAMINE PER 15 MG: Performed by: ORTHOPAEDIC SURGERY

## 2022-05-04 PROCEDURE — 27447 TOTAL KNEE ARTHROPLASTY: CPT | Performed by: PHYSICIAN ASSISTANT

## 2022-05-04 PROCEDURE — 25010000002 MORPHINE PER 10 MG: Performed by: ORTHOPAEDIC SURGERY

## 2022-05-04 PROCEDURE — C1776 JOINT DEVICE (IMPLANTABLE): HCPCS | Performed by: ORTHOPAEDIC SURGERY

## 2022-05-04 PROCEDURE — 25010000002 ONDANSETRON PER 1 MG: Performed by: NURSE ANESTHETIST, CERTIFIED REGISTERED

## 2022-05-04 PROCEDURE — 25010000002 CEFAZOLIN PER 500 MG: Performed by: NURSE ANESTHETIST, CERTIFIED REGISTERED

## 2022-05-04 DEVICE — ART/SRF KN PERSONA/VE PS EF 6TO7 11MM LT: Type: IMPLANTABLE DEVICE | Site: KNEE | Status: FUNCTIONAL

## 2022-05-04 DEVICE — IMPLANTABLE DEVICE: Type: IMPLANTABLE DEVICE | Site: KNEE | Status: FUNCTIONAL

## 2022-05-04 DEVICE — STEM TIB/KN PERSONA TRABECULAR 2PEG SZE LT: Type: IMPLANTABLE DEVICE | Site: KNEE | Status: FUNCTIONAL

## 2022-05-04 DEVICE — DEV CONTRL TISS STRATAFIX SYMM PDS PLUS VIL CT-1 45CM: Type: IMPLANTABLE DEVICE | Site: KNEE | Status: FUNCTIONAL

## 2022-05-04 DEVICE — SCRW HEX PERSONA FML 2.5X25MM PK/2: Type: IMPLANTABLE DEVICE | Site: KNEE | Status: FUNCTIONAL

## 2022-05-04 DEVICE — PAT NXGN POR 10X32MM: Type: IMPLANTABLE DEVICE | Site: KNEE | Status: FUNCTIONAL

## 2022-05-04 DEVICE — CAP TOTL KN POROUS/HYBRID PRIMARY: Type: IMPLANTABLE DEVICE | Status: FUNCTIONAL

## 2022-05-04 RX ORDER — CEFAZOLIN SODIUM 2 G/100ML
2 INJECTION, SOLUTION INTRAVENOUS ONCE
Status: DISCONTINUED | OUTPATIENT
Start: 2022-05-04 | End: 2022-05-04 | Stop reason: ALTCHOICE

## 2022-05-04 RX ORDER — MEPERIDINE HYDROCHLORIDE 25 MG/ML
12.5 INJECTION INTRAMUSCULAR; INTRAVENOUS; SUBCUTANEOUS
Status: DISCONTINUED | OUTPATIENT
Start: 2022-05-04 | End: 2022-05-04 | Stop reason: HOSPADM

## 2022-05-04 RX ORDER — EPHEDRINE SULFATE 50 MG/ML
5 INJECTION, SOLUTION INTRAVENOUS ONCE AS NEEDED
Status: DISCONTINUED | OUTPATIENT
Start: 2022-05-04 | End: 2022-05-04 | Stop reason: HOSPADM

## 2022-05-04 RX ORDER — PROPOFOL 10 MG/ML
VIAL (ML) INTRAVENOUS AS NEEDED
Status: DISCONTINUED | OUTPATIENT
Start: 2022-05-04 | End: 2022-05-04 | Stop reason: SURG

## 2022-05-04 RX ORDER — ASPIRIN 325 MG
325 TABLET, DELAYED RELEASE (ENTERIC COATED) ORAL DAILY
Status: CANCELLED | OUTPATIENT
Start: 2022-05-05

## 2022-05-04 RX ORDER — CEFAZOLIN SODIUM 1 G/3ML
INJECTION, POWDER, FOR SOLUTION INTRAMUSCULAR; INTRAVENOUS AS NEEDED
Status: DISCONTINUED | OUTPATIENT
Start: 2022-05-04 | End: 2022-05-04 | Stop reason: SURG

## 2022-05-04 RX ORDER — ONDANSETRON 2 MG/ML
4 INJECTION INTRAMUSCULAR; INTRAVENOUS ONCE AS NEEDED
Status: DISCONTINUED | OUTPATIENT
Start: 2022-05-04 | End: 2022-05-04 | Stop reason: HOSPADM

## 2022-05-04 RX ORDER — MIDAZOLAM HYDROCHLORIDE 1 MG/ML
1 INJECTION INTRAMUSCULAR; INTRAVENOUS
Status: DISCONTINUED | OUTPATIENT
Start: 2022-05-04 | End: 2022-05-04 | Stop reason: HOSPADM

## 2022-05-04 RX ORDER — CEFAZOLIN SODIUM IN 0.9 % NACL 3 G/100 ML
3 INTRAVENOUS SOLUTION, PIGGYBACK (ML) INTRAVENOUS ONCE
Status: DISCONTINUED | OUTPATIENT
Start: 2022-05-04 | End: 2022-05-04 | Stop reason: HOSPADM

## 2022-05-04 RX ORDER — OXYCODONE HCL 10 MG/1
10 TABLET, FILM COATED, EXTENDED RELEASE ORAL ONCE
Status: COMPLETED | OUTPATIENT
Start: 2022-05-04 | End: 2022-05-04

## 2022-05-04 RX ORDER — MELOXICAM 7.5 MG/1
7.5 TABLET ORAL DAILY
Qty: 30 TABLET | Refills: 0 | Status: SHIPPED | OUTPATIENT
Start: 2022-05-04 | End: 2022-06-21

## 2022-05-04 RX ORDER — HYDROMORPHONE HYDROCHLORIDE 1 MG/ML
0.5 INJECTION, SOLUTION INTRAMUSCULAR; INTRAVENOUS; SUBCUTANEOUS
Status: DISCONTINUED | OUTPATIENT
Start: 2022-05-04 | End: 2022-05-04 | Stop reason: HOSPADM

## 2022-05-04 RX ORDER — ONDANSETRON 4 MG/1
4 TABLET, FILM COATED ORAL EVERY 8 HOURS PRN
Qty: 20 TABLET | Refills: 1 | Status: SHIPPED | OUTPATIENT
Start: 2022-05-04

## 2022-05-04 RX ORDER — CEFAZOLIN SODIUM 2 G/100ML
2 INJECTION, SOLUTION INTRAVENOUS EVERY 8 HOURS
Status: DISCONTINUED | OUTPATIENT
Start: 2022-05-04 | End: 2022-05-04 | Stop reason: HOSPADM

## 2022-05-04 RX ORDER — NALOXONE HCL 0.4 MG/ML
0.4 VIAL (ML) INJECTION AS NEEDED
Status: DISCONTINUED | OUTPATIENT
Start: 2022-05-04 | End: 2022-05-04 | Stop reason: HOSPADM

## 2022-05-04 RX ORDER — FENTANYL CITRATE 50 UG/ML
50 INJECTION, SOLUTION INTRAMUSCULAR; INTRAVENOUS
Status: DISCONTINUED | OUTPATIENT
Start: 2022-05-04 | End: 2022-05-04 | Stop reason: HOSPADM

## 2022-05-04 RX ORDER — SODIUM CHLORIDE 0.9 % (FLUSH) 0.9 %
10 SYRINGE (ML) INJECTION EVERY 12 HOURS SCHEDULED
Status: DISCONTINUED | OUTPATIENT
Start: 2022-05-04 | End: 2022-05-04 | Stop reason: HOSPADM

## 2022-05-04 RX ORDER — SODIUM CHLORIDE, SODIUM LACTATE, POTASSIUM CHLORIDE, CALCIUM CHLORIDE 600; 310; 30; 20 MG/100ML; MG/100ML; MG/100ML; MG/100ML
9 INJECTION, SOLUTION INTRAVENOUS CONTINUOUS
Status: DISCONTINUED | OUTPATIENT
Start: 2022-05-04 | End: 2022-05-04 | Stop reason: HOSPADM

## 2022-05-04 RX ORDER — LIDOCAINE HYDROCHLORIDE 10 MG/ML
INJECTION, SOLUTION EPIDURAL; INFILTRATION; INTRACAUDAL; PERINEURAL AS NEEDED
Status: DISCONTINUED | OUTPATIENT
Start: 2022-05-04 | End: 2022-05-04 | Stop reason: SURG

## 2022-05-04 RX ORDER — TRANEXAMIC ACID 10 MG/ML
1000 INJECTION, SOLUTION INTRAVENOUS ONCE
Status: COMPLETED | OUTPATIENT
Start: 2022-05-04 | End: 2022-05-04

## 2022-05-04 RX ORDER — ACETAMINOPHEN 325 MG/1
650 TABLET ORAL EVERY 6 HOURS PRN
Qty: 60 TABLET | Refills: 0 | Status: SHIPPED | OUTPATIENT
Start: 2022-05-04

## 2022-05-04 RX ORDER — LIDOCAINE HYDROCHLORIDE 10 MG/ML
0.5 INJECTION, SOLUTION EPIDURAL; INFILTRATION; INTRACAUDAL; PERINEURAL ONCE AS NEEDED
Status: COMPLETED | OUTPATIENT
Start: 2022-05-04 | End: 2022-05-04

## 2022-05-04 RX ORDER — MAGNESIUM HYDROXIDE 1200 MG/15ML
LIQUID ORAL AS NEEDED
Status: DISCONTINUED | OUTPATIENT
Start: 2022-05-04 | End: 2022-05-04 | Stop reason: HOSPADM

## 2022-05-04 RX ORDER — OXYCODONE HYDROCHLORIDE 5 MG/1
5 TABLET ORAL EVERY 4 HOURS PRN
Qty: 30 TABLET | Refills: 0 | Status: SHIPPED | OUTPATIENT
Start: 2022-05-04 | End: 2022-06-21

## 2022-05-04 RX ORDER — BUPIVACAINE HCL/0.9 % NACL/PF 0.125 %
PLASTIC BAG, INJECTION (ML) EPIDURAL AS NEEDED
Status: DISCONTINUED | OUTPATIENT
Start: 2022-05-04 | End: 2022-05-04 | Stop reason: SURG

## 2022-05-04 RX ORDER — SODIUM CHLORIDE, SODIUM LACTATE, POTASSIUM CHLORIDE, CALCIUM CHLORIDE 600; 310; 30; 20 MG/100ML; MG/100ML; MG/100ML; MG/100ML
100 INJECTION, SOLUTION INTRAVENOUS CONTINUOUS
Status: DISCONTINUED | OUTPATIENT
Start: 2022-05-04 | End: 2022-05-04 | Stop reason: HOSPADM

## 2022-05-04 RX ORDER — ONDANSETRON 2 MG/ML
INJECTION INTRAMUSCULAR; INTRAVENOUS AS NEEDED
Status: DISCONTINUED | OUTPATIENT
Start: 2022-05-04 | End: 2022-05-04 | Stop reason: SURG

## 2022-05-04 RX ORDER — IBUPROFEN 600 MG/1
600 TABLET ORAL EVERY 6 HOURS PRN
Status: DISCONTINUED | OUTPATIENT
Start: 2022-05-04 | End: 2022-05-04 | Stop reason: HOSPADM

## 2022-05-04 RX ORDER — FAMOTIDINE 20 MG/1
20 TABLET, FILM COATED ORAL ONCE
Status: COMPLETED | OUTPATIENT
Start: 2022-05-04 | End: 2022-05-04

## 2022-05-04 RX ORDER — ACETAMINOPHEN 500 MG
1000 TABLET ORAL ONCE
Status: COMPLETED | OUTPATIENT
Start: 2022-05-04 | End: 2022-05-04

## 2022-05-04 RX ORDER — SODIUM CHLORIDE 0.9 % (FLUSH) 0.9 %
10 SYRINGE (ML) INJECTION AS NEEDED
Status: DISCONTINUED | OUTPATIENT
Start: 2022-05-04 | End: 2022-05-04 | Stop reason: HOSPADM

## 2022-05-04 RX ORDER — DEXAMETHASONE SODIUM PHOSPHATE 4 MG/ML
INJECTION, SOLUTION INTRA-ARTICULAR; INTRALESIONAL; INTRAMUSCULAR; INTRAVENOUS; SOFT TISSUE AS NEEDED
Status: DISCONTINUED | OUTPATIENT
Start: 2022-05-04 | End: 2022-05-04 | Stop reason: SURG

## 2022-05-04 RX ORDER — ASPIRIN 325 MG
325 TABLET ORAL DAILY
Qty: 42 TABLET | Refills: 0 | Status: SHIPPED | OUTPATIENT
Start: 2022-05-04

## 2022-05-04 RX ORDER — DOCUSATE SODIUM 100 MG/1
100 CAPSULE, LIQUID FILLED ORAL 2 TIMES DAILY
Qty: 60 CAPSULE | Refills: 1 | Status: SHIPPED | OUTPATIENT
Start: 2022-05-04 | End: 2022-06-21

## 2022-05-04 RX ORDER — BUPIVACAINE HYDROCHLORIDE 2.5 MG/ML
INJECTION, SOLUTION EPIDURAL; INFILTRATION; INTRACAUDAL
Status: COMPLETED | OUTPATIENT
Start: 2022-05-04 | End: 2022-05-04

## 2022-05-04 RX ORDER — OXYCODONE HYDROCHLORIDE AND ACETAMINOPHEN 5; 325 MG/1; MG/1
1 TABLET ORAL ONCE AS NEEDED
Status: DISCONTINUED | OUTPATIENT
Start: 2022-05-04 | End: 2022-05-04 | Stop reason: HOSPADM

## 2022-05-04 RX ORDER — ONDANSETRON 4 MG/1
4 TABLET, FILM COATED ORAL ONCE AS NEEDED
Status: DISCONTINUED | OUTPATIENT
Start: 2022-05-04 | End: 2022-05-04 | Stop reason: HOSPADM

## 2022-05-04 RX ORDER — BUPIVACAINE HYDROCHLORIDE 5 MG/ML
INJECTION, SOLUTION PERINEURAL
Status: COMPLETED | OUTPATIENT
Start: 2022-05-04 | End: 2022-05-04

## 2022-05-04 RX ADMIN — ONDANSETRON 4 MG: 2 INJECTION INTRAMUSCULAR; INTRAVENOUS at 12:53

## 2022-05-04 RX ADMIN — PROPOFOL 50 MG: 10 INJECTION, EMULSION INTRAVENOUS at 11:35

## 2022-05-04 RX ADMIN — BUPIVACAINE HYDROCHLORIDE 2 ML: 5 INJECTION, SOLUTION PERINEURAL at 10:57

## 2022-05-04 RX ADMIN — CEFAZOLIN SODIUM 3 G: 1 INJECTION, POWDER, FOR SOLUTION INTRAMUSCULAR; INTRAVENOUS at 10:50

## 2022-05-04 RX ADMIN — OXYCODONE HYDROCHLORIDE 10 MG: 10 TABLET, FILM COATED, EXTENDED RELEASE ORAL at 09:36

## 2022-05-04 RX ADMIN — TRANEXAMIC ACID 1000 MG: 10 INJECTION, SOLUTION INTRAVENOUS at 12:40

## 2022-05-04 RX ADMIN — FAMOTIDINE 20 MG: 20 TABLET ORAL at 09:36

## 2022-05-04 RX ADMIN — Medication 50 MCG: at 12:00

## 2022-05-04 RX ADMIN — TRANEXAMIC ACID 1000 MG: 10 INJECTION, SOLUTION INTRAVENOUS at 11:01

## 2022-05-04 RX ADMIN — PROPOFOL 50 MG: 10 INJECTION, EMULSION INTRAVENOUS at 10:54

## 2022-05-04 RX ADMIN — SODIUM CHLORIDE, POTASSIUM CHLORIDE, SODIUM LACTATE AND CALCIUM CHLORIDE: 600; 310; 30; 20 INJECTION, SOLUTION INTRAVENOUS at 12:47

## 2022-05-04 RX ADMIN — PROPOFOL 75 MCG/KG/MIN: 10 INJECTION, EMULSION INTRAVENOUS at 11:03

## 2022-05-04 RX ADMIN — DEXAMETHASONE SODIUM PHOSPHATE 4 MG: 4 INJECTION, SOLUTION INTRA-ARTICULAR; INTRALESIONAL; INTRAMUSCULAR; INTRAVENOUS; SOFT TISSUE at 11:50

## 2022-05-04 RX ADMIN — LIDOCAINE HYDROCHLORIDE 30 MG: 10 INJECTION, SOLUTION EPIDURAL; INFILTRATION; INTRACAUDAL; PERINEURAL at 10:54

## 2022-05-04 RX ADMIN — ACETAMINOPHEN 1000 MG: 500 TABLET ORAL at 09:36

## 2022-05-04 RX ADMIN — BUPIVACAINE HYDROCHLORIDE 30 ML: 2.5 INJECTION, SOLUTION EPIDURAL; INFILTRATION; INTRACAUDAL; PERINEURAL at 13:38

## 2022-05-04 RX ADMIN — ROPIVACAINE HYDROCHLORIDE 10 ML/HR: 5 INJECTION, SOLUTION EPIDURAL; INFILTRATION; PERINEURAL at 14:10

## 2022-05-04 RX ADMIN — MUPIROCIN 1 APPLICATION: 20 OINTMENT TOPICAL at 09:36

## 2022-05-04 RX ADMIN — SODIUM CHLORIDE, POTASSIUM CHLORIDE, SODIUM LACTATE AND CALCIUM CHLORIDE 9 ML/HR: 600; 310; 30; 20 INJECTION, SOLUTION INTRAVENOUS at 09:36

## 2022-05-04 RX ADMIN — Medication 100 MCG: at 12:12

## 2022-05-04 RX ADMIN — LIDOCAINE HYDROCHLORIDE 0.5 ML: 10 INJECTION, SOLUTION EPIDURAL; INFILTRATION; INTRACAUDAL; PERINEURAL at 09:36

## 2022-05-04 NOTE — INTERVAL H&P NOTE
"Pre-Op H&P (See Recent Office Note Attached for Full H&P)    Chief complaint: Left knee pain    HPI:      Patient is a 46 y.o. female who presents with a history of left knee pain. She has been diagnosed with primary osteoarthritis of the left knee. Conservative treatment has failed to provide significant relief. Surgical intervention is recommended and she is agreeable. She is here today for a left total knee arthroplasty.    Review of Systems:  General ROS:  no fever, chills, rashes.  No change since last office visit.  No recent sick exposure  Cardiovascular ROS: no chest pain or dyspnea on exertion; +HTN  Respiratory ROS: no cough, shortness of breath, or wheezing  Endocrine ROS: +DM      Meds:    No current facility-administered medications on file prior to encounter.     Current Outpatient Medications on File Prior to Encounter   Medication Sig Dispense Refill    amLODIPine (NORVASC) 5 MG tablet Take 5 mg by mouth Daily.      chlorhexidine (HIBICLENS) 4 % external liquid Apply  topically to the appropriate area as directed Daily. Shower w/ solution for 5 days before surgery. Bring to [facility] to be filled. 236 mL 0    citalopram (CeleXA) 40 MG tablet Take 40 mg by mouth Daily.      losartan (COZAAR) 100 MG tablet Take 100 mg by mouth Daily.      mupirocin (BACTROBAN) 2 % ointment 1 application into the nostril(s) as directed by provider 2 (Two) Times a Day. Apply to each nostril twice daily for 5 days before surgery. 22 g 0    propranolol LA (INDERAL LA) 160 MG 24 hr capsule Take 160 mg by mouth Daily.         Vital Signs:  /77 (BP Location: Right arm, Patient Position: Lying)   Pulse 55   Temp 97.7 °F (36.5 °C) (Temporal)   Resp 16   Ht 165.1 cm (65\")   Wt 118 kg (260 lb 7.6 oz)   LMP 04/29/2022   SpO2 97%   BMI 43.35 kg/m²     Physical Exam:    CV:  S1S2 regular rate and rhythm, no murmur               Resp:  Clear to auscultation; respirations regular, even and unlabored    Results Review: "     Lab Results   Component Value Date    WBC 7.24 04/21/2022    HGB 13.1 04/21/2022    HCT 38.4 04/21/2022    MCV 86.3 04/21/2022     04/21/2022    NEUTROABS 2.51 01/13/2022    GLUCOSE 234 (H) 01/13/2022    BUN 13 01/13/2022    CREATININE 0.73 01/13/2022    EGFRIFNONA 85 01/13/2022     01/13/2022    K 4.3 04/21/2022     01/13/2022    CO2 26.0 01/13/2022    CALCIUM 9.8 01/13/2022        I reviewed the patient's new clinical results.    Cancer Staging (if applicable)  Cancer Patient: __ yes _X_no __unknown; If yes, clinical stage T:__ N:__M:__, stage group or __N/A    Assessment: Primary osteoarthritis of the left knee    Plan: Left total knee arthroplasty      Yvonne Means, LISSETTE   5/4/2022   09:38 EDT

## 2022-05-04 NOTE — ANESTHESIA PROCEDURE NOTES
Peripheral Block      Patient reassessed immediately prior to procedure    Patient location during procedure: post-op  Reason for block: at surgeon's request and post-op pain management  Performed by  CRNA/CAA: Latricia Cheng CRNA  Assisted by: Desi Tony RNSRNA: Radha Aaron SRNA  Preanesthetic Checklist  Completed: patient identified, IV checked, site marked, risks and benefits discussed, surgical consent, monitors and equipment checked, pre-op evaluation and timeout performed  Prep:  Pt Position: supine  Sterile barriers:cap, gloves, mask and sterile barriers  Prep: ChloraPrep  Patient monitoring: blood pressure monitoring, continuous pulse oximetry and EKG  Procedure    Sedation: no  Performed under: spinal  Guidance:ultrasound guided  Images:still images obtained, printed/placed on chart    Laterality:left  Block Type:adductor canal block  Injection Technique:catheter  Needle Type:Tuohy and echogenic  Needle Gauge:18 G  Resistance on Injection: none  Catheter Size:20 G (20g)  Cath Depth at skin: 10 cm    Medications Used: bupivacaine PF (MARCAINE) 0.25 % injection, 30 mL      Post Assessment  Injection Assessment: negative aspiration for heme, incremental injection and no paresthesia on injection  Patient Tolerance:comfortable throughout block  Complications:no  Additional Notes  Procedure:             The pt was placed in the Supine position.  The Insertion site was  prepped and Draped in sterile fashion.  The pt was anesthetized with  IV Sedation( see meds).  Skin and cutaneous tissue was infiltrated and anesthetized with 1% Lidocaine 3 mls via a 25g needle.  A BBraun 4 inch 18g echogenic needle was then  inserted approximately midline, mid-thigh and advanced In-plane with Ultrasound guidance.  Normal Saline PSF was utilized for hydrodissection of tissue.  The Vastus medialis and Sartorius muscle where visualized and the needle tip was placed in the adductor canal,  lateral to the femoral  artery.  LA injection spread was visualized, injection was incremental 1-5ml, injection pressure was normal or little, no intraneural injection, no vascular injection.  LA dose was injected thru the needle(see dose above).  A BBraun 20g wire stylet catheter was placed via the needle with ultrasound visualization and confirmation with NS fluid bolus. The catheter insertion site was sealed with exofin tissue adhesive. The labeled catheter was then coiled and secured to skin with benzoin,  steristrips and CHG transparent dressing.  Appropriate labels were applied.  Thank you.

## 2022-05-04 NOTE — THERAPY EVALUATION
Patient Name: Luz Oseguera  : 1975    MRN: [id number]                              Today's Date: 2022       Admit Date: 2022    Visit Dx:     ICD-10-CM ICD-9-CM   1. Primary osteoarthritis of left knee  M17.12 715.16     Patient Active Problem List   Diagnosis   • OA (osteoarthritis) of knee     Past Medical History:   Diagnosis Date   • Arthritis of neck 2022    Diagnosed by chiropractor   • Diabetes (HCC)    • Hypertension    • Knee swelling Over 5 years ago     Past Surgical History:   Procedure Laterality Date   • SHOULDER SURGERY Left       General Information     Row Name 22 1600          Physical Therapy Time and Intention    Document Type evaluation  -TRUMAN     Mode of Treatment individual therapy;physical therapy  -TRUMAN     Row Name 22 1600          General Information    Patient Profile Reviewed yes  -TRUMAN     Prior Level of Function min assist:;all household mobility;transfer;bed mobility;ADL's  -TRUMAN     Existing Precautions/Restrictions fall;other (see comments)  L adductor nerve cath  -TRUMAN     Barriers to Rehab none identified  -TRUMAN     Row Name 22 1600          Living Environment    People in Home spouse  -TRUMAN     Row Name 22 1600          Home Main Entrance    Number of Stairs, Main Entrance three  -TRUMAN     Stair Railings, Main Entrance none  -TRUMAN     Row Name 22 1600          Stairs Within Home, Primary    Stairs, Within Home, Primary 0  -TRUMAN     Number of Stairs, Within Home, Primary none  -TRUMAN     Row Name 22 1600          Cognition    Orientation Status (Cognition) oriented x 4  -TRUMAN     Row Name 22 1600          Safety Issues, Functional Mobility    Safety Issues Affecting Function (Mobility) safety precaution awareness;safety precautions follow-through/compliance  -TRUMAN     Impairments Affecting Function (Mobility) endurance/activity tolerance;strength;pain;range of motion (ROM)  -TRUMAN           User Key  (r) = Recorded By, (t) = Taken By, (c) =  Cosigned By    Initials Name Provider Type    TRUMAN Sachin Collado, PT Physical Therapist               Mobility     Row Name 05/04/22 1600          Bed Mobility    Bed Mobility scooting/bridging;supine-sit;sit-supine  -TRUMAN     Scooting/Bridging [place] (Bed Mobility) supervision;verbal cues  -TRUMAN     Supine-Sit [place] (Bed Mobility) supervision;verbal cues  -TRUMAN     Sit-Supine [place] (Bed Mobility) verbal cues;supervision  -TRUMAN     Assistive Device (Bed Mobility) bed rails;head of bed elevated  -TRUMAN     Comment, (Bed Mobility) Verbal cues for LE sequencing off of/onto EOB and trunk control into sitting/supine  -TRUMAN     Row Name 05/04/22 1600          Transfers    Comment, (Transfers) CGA for toilet transfer  -TRUMAN     Row Name 05/04/22 1600          Sit-Stand Transfer    Sit-Stand [place] (Transfers) verbal cues;contact guard  -TRUMAN     Assistive Device (Sit-Stand Transfers) walker, front-wheeled  -TRUMAN     Comment, (Sit-Stand Transfer) Verbal cues for safe hand placement during standing/sitting and moving L LE out for comfort prior to sitting  -TRUMAN     Row Name 05/04/22 1600          Gait/Stairs (Locomotion)    [place] Level (Gait) verbal cues;contact guard;1 person to manage equipment  -TRUMAN     Assistive Device (Gait) walker, front-wheeled  -TRUMAN     Distance in Feet (Gait) 350  -TRUMAN     Deviations/Abnormal Patterns (Gait) bilateral deviations;ashley decreased;gait speed decreased;weight shifting decreased  -TRUMAN     Bilateral Gait Deviations forward flexed posture  -TRUMAN     Left Sided Gait Deviations heel strike decreased;weight shift ability decreased  -TRUMAN     [place] Level (Stairs) verbal cues;contact guard  -TRUMAN     Assistive Device (Stairs) cane, straight;other (see comments)  STC on the R and L HHA  -TRUMAN     Handrail Location (Stairs) none  -TRUMAN     Number of Steps (Stairs) 3  -TRUMAN     Ascending Technique (Stairs) step-to-step  -TRUMAN     Descending Technique (Stairs) step-to-step  -TRUMAN     Comment,  (Gait/Stairs) Pt ambulated with step through pattern and decreased speed. Verbal cues for maintaining upright posture, body within walker, and increase step length. Pt ascended/descended 3 steps using STC on the R, L HHA, and CGA. Gait/stair training limited by fatigue. No knee buckling noted.  -[facility] Name 05/04/22 1600          Mobility    Extremity Weight-bearing Status left lower extremity  -     Left Lower Extremity (Weight-bearing Status) weight-bearing as tolerated (WBAT)  -           User Key  (r) = Recorded By, (t) = Taken By, (c) = Cosigned By    Initials Name Provider Type    Sachin King, PT Physical Therapist               Obj/Interventions     [facility] Name 05/04/22 1600          Range of Motion Comprehensive    General Range of Motion lower extremity range of motion deficits identified  -     Comment, General Range of Motion R LE AROM WFL; L knee AROM measured at 4-82 degrees; able to actively DF/PF  -[facility] Name 05/04/22 1600          Strength Comprehensive (MMT)    General Manual Muscle Testing (MMT) Assessment lower extremity strength deficits identified  -     Comment, General Manual Muscle Testing (MMT) Assessment R LE functionally 4+/5; L LE functionally 4-/5; IND with SLR  -[facility] Name 05/04/22 1600          Motor Skills    Therapeutic Exercise hip;knee;ankle;other (see comments)  heel raises 3x  -[facility] Name 05/04/22 1600          Hip (Therapeutic Exercise)    Hip (Therapeutic Exercise) isometric exercises  -     Hip Isometrics (Therapeutic Exercise) gluteal sets;10 repetitions  -TRUMAN     Row Name 05/04/22 1600          Knee (Therapeutic Exercise)    Knee (Therapeutic Exercise) isometric exercises;strengthening exercise  -     Knee Isometrics (Therapeutic Exercise) quad sets;10 repetitions  -     Knee Strengthening (Therapeutic Exercise) left;heel slides;SLR (straight leg raise);SAQ (short arc quad);LAQ (long arc quad);3 repetitions  -[facility] Name 05/04/22 1600           Ankle (Therapeutic Exercise)    Ankle (Therapeutic Exercise) AROM (active range of motion)  -TRUMAN     Ankle AROM (Therapeutic Exercise) bilateral;dorsiflexion;plantarflexion;10 repetitions  -TRUMAN     Row Name 05/04/22 1600          Sensory Assessment (Somatosensory)    Sensory Assessment (Somatosensory) LE sensation intact  -TRUMAN           User Key  (r) = Recorded By, (t) = Taken By, (c) = Cosigned By    Initials Name Provider Type    TRUMAN Scahin Collado, PT Physical Therapist               Goals/Plan     Row Name 05/04/22 1600          Bed Mobility Goal 1 (PT)    Activity/Assistive Device (Bed Mobility Goal 1, PT) sit to supine/supine to sit  -TRUMAN     [place] Level/Cues Needed (Bed Mobility Goal 1, PT) modified independence  -TRUMAN     Time Frame (Bed Mobility Goal 1, PT) long term goal (LTG);3 days  -TRUMAN     Row Name 05/04/22 1600          Transfer Goal 1 (PT)    Activity/Assistive Device (Transfer Goal 1, PT) sit-to-stand/stand-to-sit;walker, rolling  -TRUMAN     [place] Level/Cues Needed (Transfer Goal 1, PT) modified independence  -TRUAMN     Time Frame (Transfer Goal 1, PT) long term goal (LTG);3 days  -TRUMAN     Row Name 05/04/22 1600          Gait Training Goal 1 (PT)    Activity/Assistive Device (Gait Training Goal 1, PT) gait (walking locomotion);walker, rolling  -TRUMAN     [place] Level (Gait Training Goal 1, PT) modified independence  -TRUMAN     Distance (Gait Training Goal 1, PT) 500 feet  -TRUMAN     Time Frame (Gait Training Goal 1, PT) long term goal (LTG);3 days  -TRUMAN     Row Name 05/04/22 1600          ROM Goal 1 (PT)    ROM Goal 1 (PT) L knee AROM 0-90 degrees  -TRUMAN     Time Frame (ROM Goal 1, PT) long-term goal (LTG);3 days  -TRUMAN     Row Name 05/04/22 1600          Stairs Goal 1 (PT)    Activity/Assistive Device (Stairs Goal 1, PT) stairs, all skills;cane, straight  -TRUMAN     [place] Level/Cues Needed (Stairs Goal 1, PT) modified independence  -TRUMAN     Number of Stairs (Stairs Goal 1, PT) 3  -TRUMAN     Time Frame  (Stairs Goal 1, PT) long term goal (LTG);3 days  -     Row Name 05/04/22 1600          Therapy Assessment/Plan (PT)    Planned Therapy Interventions (PT) balance training;bed mobility training;gait training;home exercise program;patient/family education;transfer training;stair training;strengthening;ROM (range of motion)  -           User Key  (r) = Recorded By, (t) = Taken By, (c) = Cosigned By    Initials Name Provider Type    Sachin King, ERIN Physical Therapist               Clinical Impression     Row Name 05/04/22 1600          Pain    Pretreatment Pain Rating 0/10 - no pain  -TRUMAN     Posttreatment Pain Rating 2/10  -TRUMAN     Pain Location - Side/Orientation Left  -TRUMAN     Pain Location posterior  -TRUMAN     Pain Location - knee  -TRUMAN     Pain Intervention(s) Ambulation/increased activity;Repositioned;Cold applied  -     Row Name 05/04/22 1600          Therapy Assessment/Plan (PT)    Patient/Family Therapy Goals Statement (PT) To return home  -     Rehab Potential (PT) good, to achieve stated therapy goals  -     Criteria for Skilled Interventions Met (PT) yes;meets criteria;skilled treatment is necessary  -     Therapy Frequency (PT) 2 times/day  -     Row Name 05/04/22 1600          Positioning and Restraints    Pre-Treatment Position in bed  -TRUMAN     Post Treatment Position bed  -TRUMAN     In Bed notified nsg;fowlers;call light within reach;encouraged to call for assist;with nsg  -TRUMAN           User Key  (r) = Recorded By, (t) = Taken By, (c) = Cosigned By    Initials Name Provider Type    Sachin King, PT Physical Therapist               Outcome Measures     Row Name 05/04/22 1600          How much help from another person do you currently need...    Turning from your back to your side while in flat bed without using bedrails? 4  -TRUMAN     Moving from lying on back to sitting on the side of a flat bed without bedrails? 4  -TRUMAN     Moving to and from a bed to a chair (including a wheelchair)? 3  -TRUMAN      Standing up from a chair using your arms (e.g., wheelchair, bedside chair)? 3  -TRUMAN     Climbing 3-5 steps with a railing? 3  -TRUMAN     To walk in hospital room? 3  -TRUMAN     AM-PAC 6 Clicks Score (PT) 20  -     Highest level of mobility 6 --> Walked 10 steps or more  -     Row Name 05/04/22 1600          PADD    Diagnosis 1  -TRUMAN     Gender 1  -TRUMAN     Age Group 1  -TRUMAN     Gait Distance 1  -TRUMAN     Assist Level 1  -TRUMAN     Home Support 3  -TRUMAN     PADD Score 9  -TRUMAN     Patient Preference home with outpatient rehab  -     Prediction by PADD Score directly home (with home health or out-patient rehab)  -     Row Name 05/04/22 1600          Functional Assessment    Outcome Measure Options AM-PAC 6 Clicks Basic Mobility (PT);PADD  -           User Key  (r) = Recorded By, (t) = Taken By, (c) = Cosigned By    Initials Name Provider Type    Sachin King, PT Physical Therapist                             Physical Therapy Education                 Title: PT OT SLP Therapies (Done)     Topic: Physical Therapy (Done)     Point: Mobility training (Done)     Learning Progress Summary           Patient Acceptance, E,D,H, VU by  at 5/4/2022 1600    Comment: Educated on safe sequencing with bed mobility, ambulatory/car/toilet transfers, gait, and stair training. Reviewed HEP and knee precautions via handout.                   Point: Home exercise program (Done)     Learning Progress Summary           Patient Acceptance, E,D,H, VU by  at 5/4/2022 1600    Comment: Educated on safe sequencing with bed mobility, ambulatory/car/toilet transfers, gait, and stair training. Reviewed HEP and knee precautions via handout.                   Point: Body mechanics (Done)     Learning Progress Summary           Patient Acceptance, E,D,H, VU by  at 5/4/2022 1600    Comment: Educated on safe sequencing with bed mobility, ambulatory/car/toilet transfers, gait, and stair training. Reviewed HEP and knee precautions via handout.                    Point: Precautions (Done)     Learning Progress Summary           Patient Acceptance, E,D,H, VU by  at 5/4/2022 1600    Comment: Educated on safe sequencing with bed mobility, ambulatory/car/toilet transfers, gait, and stair training. Reviewed HEP and knee precautions via handout.                               User Key     Initials Effective Dates Name Provider Type Discipline     06/16/21 -  Sachin Collado, PT Physical Therapist PT              PT Recommendation and Plan  Planned Therapy Interventions (PT): balance training, bed mobility training, gait training, home exercise program, patient/family education, transfer training, stair training, strengthening, ROM (range of motion)  Plan of Care Reviewed With: patient  Progress: improving  Outcome Evaluation: PT eval complete. Pt ambulated 350 feet using RW, CGA, and one person to manage equipment. Pt ascended/descended 3 steps using STC on the R, L HHA, and CGA. Gait/stair training limited by fatigue. Bed mobility performed with supervision. STS and toilet transfers with CGA. No knee buckling noted. Pt IND with SLR. L knee AROM 4-82 degrees. Reviewed HEP and knee precautions via handout. Educated on safe car transfers. PADD score = 9. ADLs assessed, pt does not require OT eval tonight. Functionally, pt safe to d/c home with assist today from a PT perspective. Recommend OPPT.     Time Calculation:    PT Charges     Row Name 05/04/22 1600             Time Calculation    Start Time 1600  -TRUMAN      PT Received On 05/04/22  -      PT Goal Re-Cert Due Date 05/14/22  -              Time Calculation- PT    Total Timed Code Minutes- PT 20 minute(s)  -TRUMAN              Timed Charges    16970 - PT Therapeutic Exercise Minutes 6  -TRUMAN      65299 - Gait Training Minutes  10  -      04455 - PT Therapeutic Activity Minutes 4  -TRUMAN              Untimed Charges    PT Eval/Re-eval Minutes 35  -TRUMAN              Total Minutes    Timed Charges Total Minutes 20  -TRUMAN      Untimed  Charges Total Minutes 35  -TRUMAN       Total Minutes 55  -TRUMAN            User Key  (r) = Recorded By, (t) = Taken By, (c) = Cosigned By    Initials Name Provider Type    Sachin King, PT Physical Therapist              Therapy Charges for Today     Code Description Service Date Service Provider Modifiers Qty    [id number]  GAIT TRAINING EA 15 MIN 5/4/2022 Sachin Collado, PT GP 1    [id number]  PT EVAL LOW COMPLEXITY 3 5/4/2022 Sachin Collado, PT GP 1    [id number]  PT THER SUPP EA 15 MIN 5/4/2022 Sachin Collado, PT GP 3          PT G-Codes  Outcome Measure Options: AM-PAC 6 Clicks Basic Mobility (PT), PADD  AM-PAC 6 Clicks Score (PT): 20    Sachin Collado, PT  5/4/2022

## 2022-05-04 NOTE — OP NOTE
Orthopaedics Operative Report    PREOPERATIVE DIAGNOSIS: Primary osteoarthritis left knee    POSTOPERATIVE DIAGNOSIS: Same    PROCEDURE PERFORMED: Left total knee arthroplasty, CPT 62064    SURGEON: Rolo Vale MD    ANESTHESIA:  Choice    STAFF:  Circulator: Shaun Dukes RN; Kraig Mejia RN  Scrub Person: Linn Kirkpatrick; Irineo Roy  Vendor Representative: Gavin Green  Nursing Assistant: Lisa Zabala PCT; Robert, Paige  Assistant: Mellisa Yoo PA-C    TOURNIQUET TIME: approx 75 min    ESTIMATED BLOOD LOSS: 50 mL    COMPLICATIONS: None apparent.    RELEASES:None required after bone cuts made and osteophytes removed    TXA:IV    IMPLANTS:     Implant Name Type Inv. Item Serial No.  Lot No. LRB No. Used Action   DEV CONTRL TISS STRATAFIX SYMM PDS PLUS CHARLENE CT-1 45CM - [id number] Implant DEV CONTRL TISS STRATAFIX SYMM PDS PLUS CHARLENE CT-1 45CM  [employer]  DIV [employer] J [employer] J [id number] Left 1 Implanted   SCRW HEX PERSONA FML 2.5X25MM PK/2 - [id number] Implant SCRW HEX PERSONA FML 2.5X25MM PK/2  [employer] [id number] Left 1 Implanted   PAT NXGN POR 60G91NP - [id number] Implant PAT NXGN POR [id number]  [employer] [id number] Left 1 Implanted   COMP FEM/KN PERSONA CR POR COCR NRW SZ7 LT - [id number] Implant COMP FEM/KN PERSONA CR POR COCR NRW SZ7 LT  [employer] [id number] Left 1 Implanted   STEM TIB PERSONA TRABECULAR 2PEG JHONNY LT - [id number] Implant STEM TIB PERSONA TRABECULAR 2PEG JHONNY LT  [employer] [id number] Left 1 Implanted   ART/SRF KN PERSONA/VE PS EF 6TO7 11MM LT - [id number] Implant ART/SRF KN PERSONA/VE PS EF 6TO7 11MM LT  [employer] [id number] Left 1 Implanted       Tk Persona CR TM femur size 7 narrow   size E TM tibia  32 TM patella button   Size 11 Vitamin E Medial Congruent highly crosslinked polyethylene articular surface    PREOPERATIVE ANTIBIOTICS: Kefzol    REFERRING PHYSICIAN: Lucina England MD    INDICATIONS: Failure of nonoperative treatment including injections,  bracing, and activity modification.    DESCRIPTION OF PROCEDURE: After informed consent was obtained, the patient was taken to the operating room. The patient was given a dose of IV antibiotics prior to incision.After the smooth induction of spinal anesthesia, the patient’s left lower extremity was prepped and draped in the usual fashion for this type of procedure. We performed a timeout to verify site and the procedure to be performed.  We began with exsanguination of the left lower extremity using an Esmarch bandage and inflation of tourniquet to 300 mmHg. We made our standard midline incision and medial parapatellar approach. We took 20% of the quadriceps tendon medially and a sleeve of tissue around the patella for repair as well a sliver of patellar tendon. Dissected extra-ostially but subperiosteally on the medial side taking off the superficial and deep MCL from the proximal tibia. These were protected throughout the procedure. Visible medial meniscus was excised. The retropatellar fat pad was excised. The ACL and visible lateral meniscus was excised as well as the synovium from the anterior surface of the distal femur.  Osteophytes were removed from the distal femur and proximal tibia at this point.        We then everted the patella and this was resurfaced, restoring patellar height. The patellar height was 25 mm preoperatively and we cut the patella to 15 mm and sized the patella to a 32.  The lugholes were drilled and the component slightly medialized.       We then turned our attention to preparation of the femur. We placed our intramedullary distal femoral guide into place set on 5 degrees of valgus and due to preoperative flexion contracture, we took an extra 2 mm of bone off of the distal femur. The distal femur was cut protecting medial and lateral structures.  We were vigilant about making sure the distal femoral cut was flat.  We then marked Magi's line and sized our femur to be a size 7. We  marked 3° of external rotation which correlated well with Clifton's line. We then secured this block into place and made our anterior, posterior, and chamfer cuts.  Again, we were vigilant about the cut flatness to make sure that the bone was appropriate for press-fit.  It was noted that the bone was excellent quality overall.  The pins were removed and the bone cuts were completed and freshened up. We then excised our posterior cruciate ligament and exposed the proximal tibia. We took 10 mm of bone off the lateral side. We protected medial and lateral structures during our cut as well as the patellar tendon. We completed the cut. We sized the tibia to be a size E ensuring that we had cortical contact on the medial lateral aspects of the tibial component. We then removed meniscus from the back of the knee. We used our flexion extension blocks to make sure our flexion and extension gaps were acceptable.  We checked alignment at this point as well.  We checked the appropriateness of the tibial bone for press-fit and it was felt to be appropriate and excellent overall.  We then placed the cutting guide back on to the pins to ensure that the cut was flat.  Once this was ensured, we then completed the preparation of the tibia, aiming the center of the baseplate at the medial third of the tibial tubercle.      We then completed the preparation of our femoral component.  We then trialed and were stable on the medial side at 0°, 30°, 60°, 90° and 120°. The lug holes were then drilled.  We then implanted these components into place starting with the tibial component first.  We controlled the implantation to make sure that we were parallel with the tibial cut throughout implantation.  Once we ensured that the component was fully seated, we moved our attention to the femoral component.  We used our femoral component handle and impactor and  impacted the femoral component without difficulty into place.  Once we ensured that  again the femoral component was fully seated, we then moved our attention to the patella component and this was clamped into place without difficulty.  We then trialed and a size 11 polyethylene spacer had good stability and full range of motion.  We then thoroughly irrigated the knee at that point and injected our posterior capsule with our local anesthetic consisting of 20 mL of normal saline, 20 mL of half percent lidocaine with epi, 20 mL of half percent bupivacaine, 30 mg of Toradol, and 8 mg of morphine.  We then deflated the tourniquet prior to placement of our final polyethylene spacer. Any bleeding seen in the back of the knee was controlled. The final spacer was then secured into place. We then used a final irrigation consisting of Irricept, diluted betadine and normal saline throughout the knee.  We then closed our subcutaneous layer using a 0 strata fix, running 2-0 Vicryl and interrupted 2-0 Vicryl. We closed our skin using a running 3-0 Monocryl. We placed an Exofin Fusion dressing system over the incision and took down the drapes. The patient was transferred back to their hospital bed and then taken to the recovery room in stable condition. All counts were correct postoperatively. I performed the case.     First assistant: Mellisa Yoo PA-C     The skilled assistance of the above noted first assistant was necessary during this complex surgical procedure.  The surgical assistant assisted with every aspect of the operation including, but not limited to, proper and safe positioning of the patient, obtaining adequate surgical exposure, manipulation of surgical instruments to make the proper bone cuts, cementing of the final implants, the continual process of hemostasis during the procedure itself in addition to surgical wound closure and removal of the patient from the operating table and returning the patient back to the [facility].  The assistance of the surgical assistant allowed me to  perform the most sensitive and technical potions of this operation using 2 hands, thus enhancing efficiency and patient safety.  This would not be possible without the help of a skilled assistant familiar with the procedure and capable of safely performing the aforementioned tasks.         POSTOPERATIVE PLAN:  1. Weight bearing as tolerated left lower extremity.  2. The patient will receive an indwelling low femoral nerve block in the recovery room.  3.  Patient will receive a dose of IV antibiotics prior to discharge home  4. Full dose ASA daily x 6 weeks for DVT prophylaxis.  5.  The patient [place] [facility] [place] 24 to 48 hours  6.  Discharge home once the patient has met criteria  7.  Patient will be discharged home with a prescription for oxycodone, meloxicam, Tylenol, Colace, and Zofran in addition to their DVT prophylaxis    Rolo Vale M.D.*

## 2022-05-04 NOTE — ANESTHESIA POSTPROCEDURE EVALUATION
Patient: Luz Oseguera    Procedure Summary     Date: 05/04/22 Room / Location:  SKIP OR  /  SKIP OR    Anesthesia Start: 1050 Anesthesia Stop:     Procedure: TOTAL KNEE ARTHROPLASTY- LEFT (Left Knee) Diagnosis:       Primary osteoarthritis of left knee      (Primary osteoarthritis of left knee [M17.12])    Surgeons: Rolo Vale MD Provider: Cj Hussein MD    Anesthesia Type: regional, spinal ASA Status: 3          Anesthesia Type: regional, spinal    Vitals  Vitals Value Taken Time   /71 05/04/22 1335   Temp 97.8 °F (36.6 °C) 05/04/22 1335   Pulse 63 05/04/22 1338   Resp 16 05/04/22 1335   SpO2 95 % 05/04/22 1338   Vitals shown include unvalidated device data.        Post Anesthesia Care and Evaluation    Patient location during evaluation: PACU  Patient participation: complete - patient participated  Level of consciousness: awake and alert  Pain management: adequate  Airway patency: patent  Anesthetic complications: No anesthetic complications  PONV Status: none  Cardiovascular status: hemodynamically stable and acceptable  Respiratory status: nonlabored ventilation, acceptable and nasal cannula  Hydration status: acceptable

## 2022-05-04 NOTE — ANESTHESIA PREPROCEDURE EVALUATION
Anesthesia Evaluation                  Airway   Mallampati: II  Dental      Pulmonary    Cardiovascular     (+) hypertension,       Neuro/Psych  GI/Hepatic/Renal/Endo    (+)   diabetes mellitus,     Musculoskeletal     Abdominal    Substance History      OB/GYN          Other                        Anesthesia Plan    ASA 3     regional and spinal     intravenous induction     Anesthetic plan, all risks, benefits, and alternatives have been provided, discussed and informed consent has been obtained with: patient.        CODE STATUS:

## 2022-05-04 NOTE — PLAN OF CARE
Problem: Adult Inpatient Plan of Care  Goal: Plan of Care Review  Flowsheets (Taken 5/4/2022 1600)  Progress: improving  Plan of Care Reviewed With: patient  Outcome Evaluation: PT eval complete. Pt ambulated 350 feet using RW, CGA, and one person to manage equipment. Pt ascended/descended 3 steps using STC on the R, L HHA, and CGA. Gait/stair training limited by fatigue. Bed mobility performed with supervision. STS and toilet transfers with CGA. No knee buckling noted. Pt IND with SLR. L knee AROM 4-82 degrees. Reviewed HEP and knee precautions via handout. Educated on safe car transfers. PADD score = 9. ADLs assessed, pt does not require OT eval tonight. Functionally, pt safe to d/c home with assist today from a PT perspective. Recommend OPPT.   Goal Outcome Evaluation:  Plan of Care Reviewed With: patient        Progress: improving  Outcome Evaluation: PT eval complete. Pt ambulated 350 feet using RW, CGA, and one person to manage equipment. Pt ascended/descended 3 steps using STC on the R, L HHA, and CGA. Gait/stair training limited by fatigue. Bed mobility performed with supervision. STS and toilet transfers with CGA. No knee buckling noted. Pt IND with SLR. L knee AROM 4-82 degrees. Reviewed HEP and knee precautions via handout. Educated on safe car transfers. PADD score = 9. ADLs assessed, pt does not require OT eval tonight. Functionally, pt safe to d/c home with assist today from a PT perspective. Recommend OPPT.

## 2022-05-04 NOTE — ANESTHESIA PROCEDURE NOTES
Spinal Block      Patient reassessed immediately prior to procedure    Patient location during procedure: OR  Indication:at surgeon's request  Performed By  DARREN/CAA: Emiliano Holt CRNASRNA: Radha Aaron SRNA  Preanesthetic Checklist  Completed: patient identified, IV checked, site marked, risks and benefits discussed, surgical consent, monitors and equipment checked, pre-op evaluation and timeout performed  Spinal Block Prep:  Patient Position:sitting  Sterile Tech:cap, gloves, sterile barriers and mask  Prep:Chloraprep  Patient Monitoring:blood pressure monitoring, continuous pulse oximetry and EKG  Spinal Block Procedure  Approach:midline  Guidance:landmark technique and palpation technique  Location:L4-L5  Needle Type:Sprotte  Needle Gauge:22 G  Placement of Spinal needle event:cerebrospinal fluid aspirated  Paresthesia: no  Fluid Appearance:clear  Medications: bupivacaine (MARCAINE) 0.5 % injection, 2 mL  Med Administered at 5/4/2022 10:57 AM   Post Assessment  Patient Tolerance:patient tolerated the procedure well with no apparent complications  Complications no  Additional Notes  Procedure:  Pt assisted to sitting position, with legs in position of comfort over side of bed.  Pt. instructed in optimal spine presentation, the spine was prepped/ Draped and the skin at insertion site was anesthetized with 1% Lidocaine 2 ml.  The spinal needle was then advanced until CSF flow was obtained and LA was injected:

## 2022-05-04 NOTE — NURSING NOTE
Patient received PT and Case Management has been informed that PT has signed off. Patient prefers to go home.

## 2022-05-06 NOTE — PROGRESS NOTES
CELINE Vargas    Nerve Cath Post Op Call    Patient Name: Luz Oseguera  :  1975  MRN:  [id number]  Date of Discharge: 2022    Nerve Cath Post Op Call:    Analgesia:Good  Side Effects:None  Catheter Site:clean  Patient Controlled ON Q pump infusion rate: 10ml/hr  Catheter Plan:Will continue with plan at home without changes and The patient was instructed to call ON CALL Anesthesia provider for any questions or problems  Patient/Family instructed to call ON CALL anesthesia provider for any questions or problems.  Patient Follow Up:

## 2022-05-08 NOTE — PROGRESS NOTES
CELINE Vargas    Nerve Cath Post Op Call    Patient Name: Luz Oseguera  :  1975  MRN:  [id number]  Date of Discharge: 2022    Nerve Cath Post Op Call:    Catheter Plan:Patient/Family member report nerve catheter previously discontinued, tip intact  Patient/Family instructed to call ON CALL anesthesia provider for any questions or problems.  Patient Follow Up:

## 2022-05-26 ENCOUNTER — OFFICE VISIT (OUTPATIENT)
Dept: ORTHOPEDIC SURGERY | Facility: CLINIC | Age: 47
End: 2022-05-26

## 2022-05-26 VITALS — TEMPERATURE: 97.7 F

## 2022-05-26 DIAGNOSIS — Z96.652 STATUS POST TOTAL LEFT KNEE REPLACEMENT: Primary | ICD-10-CM

## 2022-05-26 PROCEDURE — 99024 POSTOP FOLLOW-UP VISIT: CPT | Performed by: ORTHOPAEDIC SURGERY

## 2022-05-26 NOTE — PROGRESS NOTES
[facility] [place] Orthopa[facility] Surgery Clinic Note        Subjective     Post-op (3 week s/pTotal left knee arthroplasty (DOS 5/4/2022))       DAWSON Oseguera is a 46 y.o. female.  Patient is here now 3 weeks out from left total knee arthroplasty on 5/4/2022.  Because of her use, we used press-fit implants.  She is doing well overall.  No major complaints.          Objective      Physical Exam  Temp 97.7 °F (36.5 °C)   LMP 04/29/2022     There is no height or weight on file to calculate BMI.        Ortho Exam  Incision is healing and free of erythema or drainage  Calf soft and nontender  Range of motion 0-120    Imaging Reviewed:  Imaging Results (Last 24 Hours)     ** No results found for the last 24 hours. **        Reviewed x-ray images taken today in the office and they show no evidence of loosening or fracture of her press-fit implants.    Assessment    Assessment:  1. Status post total left knee replacement        Plan:  1. Status post left total knee arthroplasty--patient is doing well overall.  She is way ahead of the curve given her range of motion today.  I will see her back in 3 weeks to see how she continues to improve.  Continue with aspirin.      Rolo Vale MD  05/26/22  13:38 EDT      Dictated Utilizing Dragon Dictation.

## 2022-06-21 ENCOUNTER — OFFICE VISIT (OUTPATIENT)
Dept: ORTHOPEDIC SURGERY | Facility: CLINIC | Age: 47
End: 2022-06-21

## 2022-06-21 DIAGNOSIS — Z96.652 STATUS POST TOTAL LEFT KNEE REPLACEMENT: Primary | ICD-10-CM

## 2022-06-21 PROCEDURE — 99024 POSTOP FOLLOW-UP VISIT: CPT | Performed by: ORTHOPAEDIC SURGERY

## 2022-06-21 RX ORDER — TRAZODONE HYDROCHLORIDE 50 MG/1
50 TABLET ORAL NIGHTLY PRN
COMMUNITY
Start: 2022-06-07

## 2022-06-21 RX ORDER — MELOXICAM 15 MG/1
15 TABLET ORAL DAILY
COMMUNITY
Start: 2022-06-08

## 2022-06-21 NOTE — PROGRESS NOTES
[facility] [place] Orthopaedic Surgery Clinic Note        Subjective     Post-op (3 week follow up -- 6 weeks s/pTotal left knee arthroplasty (DOS 5/4/2022)       DAWSON Oseguera is a 46 y.o. female.  Patient returns now 6 weeks out from left total knee arthroplasty on 5/4/2022.  Denies chest pain or shortness of breath.  She is doing therapy twice a week at [facility] [place].          Objective      Physical Exam  There were no vitals taken for this visit.    There is no height or weight on file to calculate BMI.        Ortho Exam  Incision is healed and free of erythema or drainage  Calf soft and nontender  Toes pink and warm  Range of motion 0-120    Imaging Reviewed:  Imaging Results (Last 24 Hours)     ** No results found for the last 24 hours. **            Assessment    Assessment:  1. Status post total left knee replacement        Plan:  1. Status post left total knee arthroplasty--patient is doing great overall.  I will see her back in 6 weeks with an x-ray of her knee because she has a press-fit implant.  We need to follow the implants appears radiographically over time to make sure there is appropriate incorporation.  She has been told this.  Given how well she doing at 6 weeks, anticipate that she will be doing outstanding at the 6 to 12-month point.  She is ahead of the curve.      Rolo Vale MD  06/21/22  14:37 EDT      Dictated Utilizing Dragon Dictation.

## 2022-08-09 ENCOUNTER — OFFICE VISIT (OUTPATIENT)
Dept: ORTHOPEDIC SURGERY | Facility: CLINIC | Age: 47
End: 2022-08-09

## 2022-08-09 VITALS
SYSTOLIC BLOOD PRESSURE: 120 MMHG | BODY MASS INDEX: 43.34 KG/M2 | HEIGHT: 65 IN | DIASTOLIC BLOOD PRESSURE: 88 MMHG | WEIGHT: 260.14 LBS

## 2022-08-09 DIAGNOSIS — Z96.652 STATUS POST TOTAL LEFT KNEE REPLACEMENT: Primary | ICD-10-CM

## 2022-08-09 PROCEDURE — 99024 POSTOP FOLLOW-UP VISIT: CPT | Performed by: ORTHOPAEDIC SURGERY

## 2022-08-09 NOTE — PROGRESS NOTES
"    [facility] [place] Orthopaedic Surgery Clinic Note        Subjective     CC: Follow-up (7 week f/u--3 month s/pTotal left knee arthroplasty (DOS 5/4/2022)      HPI    Luz Oseguera is a 47 y.o. female.  Patient is here now 3 months out from left total knee arthroplasty on 5/4/2022.  Patient says she is doing very well overall.  She has no pain.  She says she feels a little bit tight but attributes a lot of this to not having gone to therapy for 3 weeks that she was on vacation recently.    Overall, patient's symptoms are better overall.    ROS:    Constiutional:Pt denies fever, chills, nausea, or vomiting.  MSK:as above        Objective      Past Medical History  Past Medical History:   Diagnosis Date   • Arthritis of neck April 2022    Diagnosed by chiropractor   • Diabetes (HCC)    • Hypertension    • Knee swelling Over 5 years ago         Physical Exam  /88   Ht 165.1 cm (65\")   Wt 118 kg (260 lb 2.3 oz)   BMI 43.29 kg/m²     Body mass index is 43.29 kg/m².    Patient is well nourished and well developed.        Ortho Exam  Peripheral Vascular:    Upper Extremity:   Inspection:  Left--no cyanotic nail beds Right--no cyanotic nail beds   Bilateral:  Pink nail beds with brisk capillary refill   Palpation:  Bilateral radial pulse normal    Musculoskeletal:      Lower Extremity:     Inspection and Palpation:      Left knee:  Calf:  Soft and non tender  Effusion:  None  Pulses:  2+  Warmth:  None  Incision:  Healed     ROM:  Left:  Extension:0    Flexion:115      Deformities/Malalignments/Discrepancies:    Left:  none    Functional Testing:  Left:  Straight Leg Raise: 5/5  Patella Tracking:  Normal    Imaging/Labs/EMG Reviewed:  Imaging Results (Last 24 Hours)     ** No results found for the last 24 hours. **        We reviewed x-rays of the patient's left knee today in the office.  They show stable appearance to her press-fit persona left knee implant.    Assessment    Assessment:  1. Status post total left knee " replacement        Plan:  1. Recommend over the counter anti-inflammatories for pain and/or swelling  2. Status post left total knee arthroplasty--patient has a press-fit implant.  We need to monitor her closely.  See her back in 3 months with an x-ray of her knee.  She is doing well overall.  She should continue physical therapy until released.      Rolo Vale MD  08/09/22  08:51 EDT      Dictated Utilizing Dragon Dictation.

## 2022-09-12 ENCOUNTER — TELEPHONE (OUTPATIENT)
Dept: ORTHOPEDIC SURGERY | Facility: CLINIC | Age: 47
End: 2022-09-12

## 2022-09-12 NOTE — TELEPHONE ENCOUNTER
Caller: NURYS   Relationship to Patient: SELF     Phone Number: [phone]   Reason for Call: PATIENT STATES THAT SHE IS HAVING SOME POPPING GOING ON IN HER KNEE THAT THE SX WAS DONE ON, NO AVAILABILITY UNTIL 10/04 PLEASE ADVISE

## 2022-09-21 ENCOUNTER — TELEPHONE (OUTPATIENT)
Dept: ORTHOPEDIC SURGERY | Facility: CLINIC | Age: 47
End: 2022-09-21

## 2022-09-21 NOTE — TELEPHONE ENCOUNTER
Provider: SAROJ  Caller:NURYS RDZ  Relationship to Patient: SELF  P  Phone Number: [phone]  Reason for Call:   DONALD: LEFT KNEE S/P SX 05 22 09/21/2022 PER PATIENT POST OP IS 10 13 22 GOING OUT OF COUNTRY [place] 09 30 22 - KNEE IS SWOLLEN - NOT RED OR WARM TO TOUCH - WAS WANTING TO GET IN SOONER IF POSSIBLE - NO AVAILABILITY AT TIME OF CALL.  PATIENT NEEDS CALL BACK FOR MEDICAL ADVICE AND/OR TO BE WORKED IN SOONER (DO HAVE ADDED TO CANCELLATION LIST).  HUB ADVISED WOULD NOTIFY CLINICAL FOR HER.

## 2022-09-27 ENCOUNTER — OFFICE VISIT (OUTPATIENT)
Dept: ORTHOPEDIC SURGERY | Facility: CLINIC | Age: 47
End: 2022-09-27

## 2022-09-27 VITALS
HEIGHT: 65 IN | BODY MASS INDEX: 45.48 KG/M2 | SYSTOLIC BLOOD PRESSURE: 150 MMHG | WEIGHT: 273 LBS | DIASTOLIC BLOOD PRESSURE: 100 MMHG

## 2022-09-27 DIAGNOSIS — Z96.652 STATUS POST TOTAL LEFT KNEE REPLACEMENT: Primary | ICD-10-CM

## 2022-09-27 PROCEDURE — 99213 OFFICE O/P EST LOW 20 MIN: CPT | Performed by: ORTHOPAEDIC SURGERY

## 2022-09-27 NOTE — PROGRESS NOTES
"    [facility] [place] [facility] Surgery Clinic Note        Subjective     CC: Follow-up (7 week recheck - s/pTotal left knee arthroplasty (DOS 5/4/2022))      HPI    Luz Oseguera is a 47 y.o. female.  Patient is worked in today because she is having some increasing popping in the her left knee that was replaced on 5/4/2022.  She has been back at work and under a lot of stress.  No swelling or erythema noted.    Overall, patient's symptoms are as above.    ROS:    Constiutional:Pt denies fever, chills, nausea, or vomiting.  MSK:as above        Objective      Past Medical History  Past Medical History:   Diagnosis Date   • Arthritis of neck April 2022    Diagnosed by chiropractor   • Diabetes (HCC)    • Hypertension    • Knee swelling Over 5 years ago         Physical Exam  /100   Ht 165.1 cm (65\")   Wt 124 kg (273 lb)   BMI 45.43 kg/m²     Body mass index is 45.43 kg/m².    Patient is well nourished and well developed.        Ortho Exam  Peripheral Vascular:    Upper Extremity:   Inspection:  Left--no cyanotic nail beds Right--no cyanotic nail beds   Bilateral:  Pink nail beds with brisk capillary refill   Palpation:  Bilateral radial pulse normal    Musculoskeletal:      Lower Extremity:     Inspection and Palpation:      Left knee:  Calf:  Soft and non tender  Effusion:  None  Pulses:  2+  Warmth:  None  Incision:  Healed     ROM:  Left:  Extension:0    Flexion:120      Deformities/Malalignments/Discrepancies:    Left:  none    Functional Testing:  Left:  Straight Leg Raise: 5/5  Patella Tracking:  Normal    Imaging/Labs/EMG Reviewed:  Imaging Results (Last 24 Hours)     Procedure Component Value Units Date/Time    XR Knee 3+ View With [place] Left [[id number]] Resulted: 09/27/22 1046     Updated: 09/27/22 1047    Narrative:      Knee X-ray    Indication: status-post TKA    Study:  AP, Lateral, and Sunrise views of Left knee    Comparison: Left knee 8/9/2022    Findings:  No signs of acute fracture are " visualized  No signs of loosening are appreciated  Components are well aligned    Impression:  Status post press-fit left total knee arthroplasty. No signs   of loosening or fracture.                Assessment    Assessment:  1. Status post total left knee replacement        Plan:  1. Recommend over the counter anti-inflammatories for pain and/or swelling  2. Status post left total knee arthroplasty--increased popping is likely due to either normal patellofemoral mechanics plus or minus IT band friction.  We have reassured her in this regard.  See her back in early November with an x-ray.  We will monitor her popping at that point as well.      Rolo Vale MD  09/27/22  10:52 EDT      Dictated Utilizing Dragon Dictation.

## 2022-11-08 ENCOUNTER — OFFICE VISIT (OUTPATIENT)
Dept: ORTHOPEDIC SURGERY | Facility: CLINIC | Age: 47
End: 2022-11-08

## 2022-11-08 VITALS
HEIGHT: 65 IN | SYSTOLIC BLOOD PRESSURE: 120 MMHG | DIASTOLIC BLOOD PRESSURE: 76 MMHG | BODY MASS INDEX: 45.82 KG/M2 | WEIGHT: 275 LBS

## 2022-11-08 DIAGNOSIS — Z96.652 STATUS POST TOTAL LEFT KNEE REPLACEMENT: Primary | ICD-10-CM

## 2022-11-08 PROCEDURE — 99213 OFFICE O/P EST LOW 20 MIN: CPT | Performed by: ORTHOPAEDIC SURGERY

## 2022-11-08 RX ORDER — AMOXICILLIN 875 MG/1
TABLET, COATED ORAL
COMMUNITY
Start: 2022-11-02

## 2022-11-08 NOTE — PROGRESS NOTES
"    [facility] [place] [facility] [place]        Subjective     CC: Follow-up (6 week f/u - total left knee replacement 5/4/2022)      DAWSON Oseguera is a 47 y.o. female.  Patient is now 6 months out from left total knee arthroplasty on 5/4/2022.  She is doing well overall.  No major complaints other than the persistent popping when she gets up from a seated position.  Not having any pain or swelling.  She was able to go on a cruise to the [facility] and [facility] recently and had no issues.    Overall, patient's symptoms are as above.    ROS:    Constiutional:Pt denies fever, chills, nausea, or vomiting.  MSK:as above        Objective      Past Medical History  Past Medical History:   Diagnosis Date   • Arthritis of neck April 2022    Diagnosed by chiropractor   • Diabetes (HCC)    • Hypertension    • Knee swelling Over 5 years ago         Physical Exam  /76   Ht 165.1 cm (65\")   Wt 125 kg (275 lb)   BMI 45.76 kg/m²     Body mass index is 45.76 kg/m².    Patient is well nourished and well developed.        Ortho Exam  Peripheral Vascular:    Upper Extremity:   Inspection:  Left--no cyanotic nail beds Right--no cyanotic nail beds   Bilateral:  Pink nail beds with brisk capillary refill   Palpation:  Bilateral radial pulse normal    Musculoskeletal:      Lower Extremity:     Inspection and Palpation:      Left knee:  Calf:  Soft and non tender  Effusion:  None  Pulses:  2+  Warmth:  None  Incision:  Healed     ROM:  Left:  Extension:0    Flexion:120      Deformities/Malalignments/Discrepancies:    Left:  none    Functional Testing:  Left:  Straight Leg Raise: 5/5  Patella Tracking:  Normal    Imaging/Labs/EMG Reviewed:  Imaging Results (Last 24 Hours)     ** No results found for the last 24 hours. **        We have reviewed an x-ray of her left knee today in the office and this shows good incorporation of her press-fit persona implant.    Assessment    Assessment:  1. Status post total left knee " replacement        Plan:  1. Recommend over the counter anti-inflammatories for pain and/or swelling  2. Status post left total knee arthroplasty--patient is 6 months out and things overall look very good radiographically and clinically.  Reassured her that the popping is likely just a normal process and mechanics of the knee implant.  Follow-up in 6 months with an x-ray.  Continue indefinite antibiotic prophylaxis.      Rolo Vale MD  11/08/22  09:01 EST      Dictated Utilizing Dragon Dictation.

## 2023-04-05 ENCOUNTER — TELEPHONE (OUTPATIENT)
Dept: ORTHOPEDIC SURGERY | Facility: CLINIC | Age: 48
End: 2023-04-05

## 2023-04-05 NOTE — TELEPHONE ENCOUNTER
Patient called for a refill of Meloxicam (she stated that Dr. Vale was the original provider to prescribe this to her.  She is asking that we send this to the [facility] in [place].  She said that if we could just get her enough to hold her until she comes into the office for a visit on 5/23 she could then ask him for a new script.  If there are any questions or concerns the patient is available at number listed.

## 2023-04-07 DIAGNOSIS — Z96.652 STATUS POST TOTAL LEFT KNEE REPLACEMENT: Primary | ICD-10-CM

## 2023-04-07 RX ORDER — MELOXICAM 15 MG/1
15 TABLET ORAL DAILY
Qty: 30 TABLET | Refills: 1 | Status: SHIPPED | OUTPATIENT
Start: 2023-04-07

## 2023-06-13 ENCOUNTER — OFFICE VISIT (OUTPATIENT)
Dept: ORTHOPEDIC SURGERY | Facility: CLINIC | Age: 48
End: 2023-06-13
Payer: COMMERCIAL

## 2023-06-13 VITALS
BODY MASS INDEX: 44.82 KG/M2 | HEIGHT: 65 IN | SYSTOLIC BLOOD PRESSURE: 160 MMHG | WEIGHT: 269 LBS | DIASTOLIC BLOOD PRESSURE: 120 MMHG

## 2023-06-13 DIAGNOSIS — Z96.652 STATUS POST TOTAL LEFT KNEE REPLACEMENT: Primary | ICD-10-CM

## 2023-06-13 RX ORDER — BUPROPION HYDROCHLORIDE 150 MG/1
1 TABLET ORAL DAILY
COMMUNITY
Start: 2023-06-07

## 2023-06-13 ASSESSMENT — KOOS JR
KOOS JR SCORE: 79.914
KOOS JR SCORE: 3
KOOS JR SCORE: 79.91

## 2023-06-13 NOTE — PROGRESS NOTES
"    [facility] [place]        Subjective     CC: Follow-up (7 month f/u; 1 year s/p total left knee replacement 5/4/2022)      HPI    Luz Oseguera is a 47 y.o. female.  Patient here today now a year out from left press-fit total knee arthroplasty on 5/4/2022.  She is doing everything she wants to do.  She says she is been on 2 cruises and plans on going to [facility] this summer.  Has a little bit of tightness occasionally but has no major complaints with the left knee.    Overall, patient's symptoms are as above.    ROS:    Constiutional:Pt denies fever, chills, nausea, or vomiting.  MSK:as above        Objective      Past Medical History  Past Medical History:   Diagnosis Date    Arthritis of neck April 2022    Diagnosed by chiropractor    Diabetes     Hypertension     Knee swelling Over 5 years ago     Social History     Socioeconomic History    Marital status:    Tobacco Use    Smoking status: Never    Smokeless tobacco: Never   Vaping Use    Vaping Use: Never used   Substance and Sexual Activity    Alcohol use: No    Drug use: No    Sexual activity: Yes     Partners: Male     Birth control/protection: None          Physical Exam  BP (!) 160/120   Ht 163.8 cm (64.5\")   Wt 122 kg (269 lb)   BMI 45.46 kg/m²     Body mass index is 45.46 kg/m².    Patient is well nourished and well developed.        Ortho Exam    Lower Extremity:     Inspection and Palpation:      Left knee:  Calf:  Soft and non tender  Effusion:  None  Pulses:  2+  Warmth:  None  Incision:  Healed     ROM:  Left:  Extension:0    Flexion:120      Deformities/Malalignments/Discrepancies:    Left:  none    Functional Testing:  Left:  Straight Leg Raise: 5/5  Patella Tracking:  Normal      Imaging/Labs/EMG Reviewed:  Imaging Results (Last 24 Hours)       Procedure Component Value Units Date/Time    XR Knee 3+ View With [place] Left [[id number]] Resulted: 06/13/23 0905     Updated: 06/13/23 0905    Narrative:      Knee " X-ray    Indication: status-post TKA    Study:  AP, Lateral, and Sunrise views of Left knee    Comparison: Left knee 11/8/2022    Findings:  No signs of acute fracture are visualized  No signs of loosening are appreciated  Components are well aligned    Impression:  Status post Left total knee arthroplasty. No signs of   loosening or fracture.                Assessment    Assessment:  1. Status post total left knee replacement        Plan:  Recommend over the counter anti-inflammatories for pain and/or swelling  Status post left total knee arthroplasty--patient is doing excellent overall.  She is 1 year out today.  She appears to have excellent incorporation of her press-fit implant.  I will see her back in a year with x-rays.  Continue indefinite antibiotic prophylaxis.  We have asked her to stop her meloxicam at this point.      Rolo Vale MD  06/13/23  09:11 EDT      Dictated Utilizing Dragon Dictation.

## 2023-10-10 DIAGNOSIS — Z96.652 STATUS POST TOTAL LEFT KNEE REPLACEMENT: ICD-10-CM

## 2023-10-10 RX ORDER — MELOXICAM 15 MG/1
TABLET ORAL
Qty: 30 TABLET | Refills: 3 | Status: SHIPPED | OUTPATIENT
Start: 2023-10-10

## 2024-01-13 DIAGNOSIS — Z96.652 STATUS POST TOTAL LEFT KNEE REPLACEMENT: ICD-10-CM

## 2024-01-15 RX ORDER — MELOXICAM 15 MG/1
TABLET ORAL
Qty: 30 TABLET | Refills: 3 | Status: SHIPPED | OUTPATIENT
Start: 2024-01-15

## 2024-08-21 ENCOUNTER — OFFICE VISIT (OUTPATIENT)
Dept: CARDIOLOGY | Facility: CLINIC | Age: 49
End: 2024-08-21
Payer: COMMERCIAL

## 2024-08-21 VITALS
SYSTOLIC BLOOD PRESSURE: 130 MMHG | BODY MASS INDEX: 43.09 KG/M2 | HEART RATE: 74 BPM | HEIGHT: 65 IN | DIASTOLIC BLOOD PRESSURE: 84 MMHG | WEIGHT: 258.6 LBS

## 2024-08-21 DIAGNOSIS — I10 PRIMARY HYPERTENSION: ICD-10-CM

## 2024-08-21 DIAGNOSIS — R94.31 ABNORMAL EKG: ICD-10-CM

## 2024-08-21 DIAGNOSIS — R53.82 CHRONIC FATIGUE: ICD-10-CM

## 2024-08-21 DIAGNOSIS — E11.9 TYPE 2 DIABETES MELLITUS WITHOUT COMPLICATION, WITHOUT LONG-TERM CURRENT USE OF INSULIN: ICD-10-CM

## 2024-08-21 DIAGNOSIS — E66.01 MORBID OBESITY WITH BMI OF 40.0-44.9, ADULT: ICD-10-CM

## 2024-08-21 DIAGNOSIS — E53.8 B12 DEFICIENCY: ICD-10-CM

## 2024-08-21 DIAGNOSIS — R07.89 CHEST PRESSURE: Primary | ICD-10-CM

## 2024-08-21 DIAGNOSIS — E78.00 HYPERCHOLESTEROLEMIA: ICD-10-CM

## 2024-08-21 RX ORDER — SPIRONOLACTONE 100 MG/1
100 TABLET, FILM COATED ORAL DAILY
COMMUNITY

## 2024-08-21 RX ORDER — DICYCLOMINE HYDROCHLORIDE 10 MG/1
10 CAPSULE ORAL 2 TIMES DAILY
Qty: 60 CAPSULE | Refills: 6 | Status: SHIPPED | OUTPATIENT
Start: 2024-08-21

## 2024-08-21 RX ORDER — ASPIRIN 81 MG/1
81 TABLET ORAL DAILY
Qty: 90 TABLET | Refills: 3 | Status: SHIPPED | OUTPATIENT
Start: 2024-08-21

## 2024-08-21 RX ORDER — ROSUVASTATIN CALCIUM 10 MG/1
10 TABLET, COATED ORAL DAILY
Qty: 30 TABLET | Refills: 11 | Status: SHIPPED | OUTPATIENT
Start: 2024-08-21

## 2024-08-21 NOTE — PROGRESS NOTES
"Chief Complaint   Patient presents with   • Establish Care     Referred per PCP for chest pain   • Chest Pain     Patient reports she has chest pressure  , she feels it always there and is more noticeable at certain times . She says\" it feels like something needs to pop\" This has been since May 2024.   • Hypertension     Reports she has uncontrolled BP for years   • LABS and TESTING     Current labs in chart November 2023 . Patient had labs per PCP within the last month and was started on B12  supplement.  No cardiac testing has been done at this time        CARDIAC COMPLAINTS  chest pressure/discomfort      Subjective   Luz Oseguera is a 49 y.o. female came in today for her initial cardiac evaluation.  She has history of hypertension, diabetes and obesity.  She has been having chest pain in the form of pressure-like feeling.  It started around May.  She initially guarded once or twice a week but now she is getting it almost every day.  It occurs mostly in the middle of the day.  She feels like she can pop the chest and feels better but nothing happens.  It occurs mostly on exertion or when she is doing her day-to-day activities.  Very rarely it occurs at rest.  It normally last for 5 to 20 minutes and subside.  It is not associated with shortness of breath, palpitation or diaphoresis.  She denies having any dizziness or loss of consciousness.  She does have increasing fatigability in the last 1 year.  Her last set of labs I have is from November of and apparently she had some done 2 months ago.  Her cholesterol was 209 with the LDL of 145 and HDL of 39.  Her blood sugar was 163 with a normal renal function and liver function.  Her blood count was normal.  She also has significant obesity and she was started on Ozempic and the dose was gradually increased.  She has been taking it for almost a year and last more than 30 pounds.  The present dose of Ozempic was started around April or May.  She has no history of " [Waves bye-bye] : waves bye-bye [Indicates wants] : indicates wants smoking or drinking alcohol.  Her mother had atrial fibrillation and developed stroke    Past Surgical History:   Procedure Laterality Date   • SHOULDER SURGERY Left    • TOTAL KNEE ARTHROPLASTY Left 5/4/2022    Procedure: TOTAL KNEE ARTHROPLASTY- LEFT;  Surgeon: Rolo Vale MD;  Location: [facility];  Service: Orthopedics;  Laterality: Left;       Current Outpatient Medications   Medication Sig Dispense Refill   • acetaminophen (TYLENOL) 325 MG tablet Take 2 tablets by mouth Every 6 (Six) Hours As Needed for Mild Pain . 60 tablet 0   • amLODIPine (NORVASC) 10 MG tablet Take 1 tablet by mouth Daily.     • buPROPion XL (WELLBUTRIN XL) 150 MG 24 hr tablet Take 1 tablet by mouth Daily.     • citalopram (CeleXA) 40 MG tablet Take 1 tablet by mouth Daily.     • Continuous Blood Gluc  (Dexcom G6 ) device Daily.     • Continuous Blood Gluc Sensor (Dexcom G6 Sensor) CHANGE EVERY 10 DAYS     • Continuous Blood Gluc Transmit (Dexcom G6 Transmitter) misc CHANGE EVERY 90 DAYS     • Cyanocobalamin (VITAMIN B 12 PO) Take 1,000 mg by mouth Daily.     • losartan (COZAAR) 100 MG tablet Take 1 tablet by mouth Daily.     • meloxicam (MOBIC) 15 MG tablet TAKE 1 Tablet BY MOUTH ONCE DAILY WITH FOOD. STOP IF YOU HAVE UPSET STOMACH/STOMACH IRRITATION. 30 tablet 3   • propranolol LA (INDERAL LA) 160 MG 24 hr capsule Take 1 capsule by mouth 2 (Two) Times a Day.     • Semaglutide (OZEMPIC, 2 MG/DOSE, SC) Inject 2 mg under the skin into the appropriate area as directed 1 (One) Time Per Week.     • spironolactone (ALDACTONE) 100 MG tablet Take 1 tablet by mouth Daily.     • aspirin 81 MG EC tablet Take 1 tablet by mouth Daily. 90 tablet 3   • dicyclomine (BENTYL) 10 MG capsule Take 1 capsule by mouth 2 (Two) Times a Day. 60 capsule 6   • rosuvastatin (CRESTOR) 10 MG tablet Take 1 tablet by mouth Daily. 30 tablet 11     No current facility-administered medications for this visit.           ALLERGIES:  Patient has no  "known allergies.    Past Medical History:   Diagnosis Date   • Arthritis of neck April 2022    Diagnosed by chiropractor   • Diabetes    • Hypertension    • Knee swelling Over 5 years ago       Social History     Tobacco Use   Smoking Status Never   Smokeless Tobacco Never          Family History   Problem Relation Age of Onset   • Stroke Mother    • Osteoarthritis Mother    • Hypertension Mother    • Cancer Mother         Skin cancer   • Atrial fibrillation Mother    • Cancer Father         Tonsil/agent orange   • Clotting disorder Maternal Aunt         Acquired/Not genetic       Review of Systems   Constitutional: Positive for malaise/fatigue. Negative for decreased appetite.   HENT:  Negative for congestion and sore throat.    Eyes:  Negative for blurred vision, double vision and visual disturbance.   Cardiovascular:  Positive for chest pain. Negative for near-syncope.   Respiratory:  Negative for shortness of breath and snoring.    Endocrine: Negative for cold intolerance and heat intolerance.   Hematologic/Lymphatic: Negative for adenopathy. Does not bruise/bleed easily.   Skin:  Negative for itching, nail changes and skin cancer.   Musculoskeletal:  Negative for arthritis and myalgias.   Gastrointestinal:  Negative for abdominal pain, dysphagia and heartburn.   Genitourinary:  Negative for bladder incontinence and frequency.   Neurological:  Negative for dizziness, seizures and vertigo.   Psychiatric/Behavioral:  Negative for altered mental status.    Allergic/Immunologic: Negative for environmental allergies and hives.     Diabetes- Yes  Thyroid- normal    Objective     /84 (BP Location: Right arm, Patient Position: Sitting)   Pulse 74   Ht 165.1 cm (65\")   Wt 117 kg (258 lb 9.6 oz)   BMI 43.03 kg/m²     Vitals and nursing note reviewed.   Constitutional:       Appearance: Healthy appearance. Not in distress.   Eyes:      Conjunctiva/sclera: Conjunctivae normal.      Pupils: Pupils are equal, round, " [Thumb - finger grasp] : thumb - finger grasp [Anitra] : anitra and reactive to light.   HENT:      Head: Normocephalic.   Pulmonary:      Effort: Pulmonary effort is normal.      Breath sounds: Normal breath sounds.   Cardiovascular:      PMI at left midclavicular line. Normal rate. Regular rhythm.      Murmurs: There is a grade 3/6 high frequency blowing holosystolic murmur at the apex.   Abdominal:      General: Bowel sounds are normal.      Palpations: Abdomen is soft.   Musculoskeletal: Normal range of motion.      Cervical back: Normal range of motion and neck supple. Skin:     General: Skin is warm and dry.   Neurological:      Mental Status: Alert, oriented to person, place, and time and oriented to person, place and time.       ECG 12 Lead    Date/Time: 8/21/2024 12:58 PM  Performed by: Sona Maldonado MD    Authorized by: Sona Maldonado MD  Previous ECG: no previous ECG available  Rhythm: sinus rhythm  Rate: normal  Q waves: V1 and V2    QRS axis: normal  Other findings: left ventricular hypertrophy    Clinical impression: abnormal EKG        @ASSESSMENT/PLAN@        Diagnoses and all orders for this visit:    1. Chest pressure (Primary)  -     Stress Test With Myocardial Perfusion One Day; Future  -     Adult Transthoracic Echo Complete W/ Cont if Necessary Per Protocol; Future  -     High Sensitivity CRP; Future  -     dicyclomine (BENTYL) 10 MG capsule; Take 1 capsule by mouth 2 (Two) Times a Day.  Dispense: 60 capsule; Refill: 6  -     aspirin 81 MG EC tablet; Take 1 tablet by mouth Daily.  Dispense: 90 tablet; Refill: 3    2. Primary hypertension    3. Hypercholesterolemia  -     rosuvastatin (CRESTOR) 10 MG tablet; Take 1 tablet by mouth Daily.  Dispense: 30 tablet; Refill: 11    4. Type 2 diabetes mellitus without complication, without long-term current use of insulin    5. Morbid obesity with BMI of 40.0-44.9, adult  -     Overnight Sleep Oximetry Study; Future    6. Chronic fatigue  -     Adult Transthoracic Echo Complete W/ Cont if Necessary Per  Protocol; Future  -     Overnight Sleep Oximetry Study; Future    7. Abnormal EKG    8. B12 deficiency  -     Vitamin B12; Future    At baseline, her heart rate and blood pressure appears stable.  Her EKG shows normal sinus rhythm, LVH and Q waves in the anteroseptal leads.  Her clinical examination reveals BMI of 43.  Her cardiovascular examination reveals 3/6 systolic murmur at the mitral area.    Regarding the chest pressure which is bothering her the most, I explained to her that she does have risk factor for coronary artery disease but the timing of the discomfort starting around May makes side effect of high-dose of Ozempic.  At this time I scheduled her to undergo a stress test in the form of modified Naveen.  I advised her to check a CRP level.  I also advised her to be on aspirin 81 mg once a day.  I started her on Bentyl at 10 mg twice a day to see whether it is effective, if the discomfort is due to medication.    Regarding her hypertension, she is on multiple medication which includes amlodipine, Aldactone, Inderal LA and Cozaar.  At this time I did not change any medications.  If the blood pressure continues to be a problem, she may need an ultrasound of the renal artery    Regarding her hypercholesterolemia, given her risk factors I like to keep the LDL around 70 or less.  I started her on Crestor 10 mg once a day and will recheck it in 6 months    Regarding her diabetes, she is on Ozempic.  Need to check the A1c level    Regarding her chronic fatigue and the obesity, need to rule out sleep apnea also.  I advised her to have the nocturnal pulse pO2 measurement done.  If it is abnormal she may need sleep study    Regarding the abnormal EKG, need to rule out an old infarct.  The stress test and echocardiogram should be able to answer that    Regarding her history of B12 deficiency, I advised her to start taking B12 as a sublingual.  Like to recheck the level again    Based on the results, further  [Denilson/Mama specific] : denilson/mama specific [FreeTextEntry3] : cruising, not standing recommendations will be made.               Electronically signed by Sona Maldonado MD August 21, 2024 12:58 EDT

## 2024-08-21 NOTE — LETTER
"August 21, 2024       No Recipients    Patient: Luz Oseguera   YOB: 1975   Date of Visit: 8/21/2024     Dear Lucina England MD:       Thank you for referring Luz Oseguera to me for evaluation. Below are the relevant portions of my assessment and plan of care.    If you have questions, please do not hesitate to call me. I look forward to following Luz along with you.         Sincerely,        Sona Maldonado MD        CC:   No Recipients    Sona Maldonado MD  08/21/24 1259  Sign when Signing Visit  Chief Complaint   Patient presents with   • Establish Care     Referred per PCP for chest pain   • Chest Pain     Patient reports she has chest pressure  , she feels it always there and is more noticeable at certain times . She says\" it feels like something needs to pop\" This has been since May 2024.   • Hypertension     Reports she has uncontrolled BP for years   • LABS and TESTING     Current labs in chart November 2023 . Patient had labs per PCP within the last month and was started on B12  supplement.  No cardiac testing has been done at this time        CARDIAC COMPLAINTS  chest pressure/discomfort      Subjective  Luz Oseguera is a 49 y.o. female came in today for her initial cardiac evaluation.  She has history of hypertension, diabetes and obesity.  She has been having chest pain in the form of pressure-like feeling.  It started around May.  She initially guarded once or twice a week but now she is getting it almost every day.  It occurs mostly in the middle of the day.  She feels like she can pop the chest and feels better but nothing happens.  It occurs mostly on exertion or when she is doing her day-to-day activities.  Very rarely it occurs at rest.  It normally last for 5 to 20 minutes and subside.  It is not associated with shortness of breath, palpitation or diaphoresis.  She denies having any dizziness or loss of consciousness.  She does have increasing fatigability in " the last 1 year.  Her last set of labs I have is from November of and apparently she had some done 2 months ago.  Her cholesterol was 209 with the LDL of 145 and HDL of 39.  Her blood sugar was 163 with a normal renal function and liver function.  Her blood count was normal.  She also has significant obesity and she was started on Ozempic and the dose was gradually increased.  She has been taking it for almost a year and last more than 30 pounds.  The present dose of Ozempic was started around April or May.  She has no history of smoking or drinking alcohol.  Her mother had atrial fibrillation and developed stroke    Past Surgical History:   Procedure Laterality Date   • SHOULDER SURGERY Left    • TOTAL KNEE ARTHROPLASTY Left 5/4/2022    Procedure: TOTAL KNEE ARTHROPLASTY- LEFT;  Surgeon: Rolo Vale MD;  Location: [facility];  Service: Orthopedics;  Laterality: Left;       Current Outpatient Medications   Medication Sig Dispense Refill   • acetaminophen (TYLENOL) 325 MG tablet Take 2 tablets by mouth Every 6 (Six) Hours As Needed for Mild Pain . 60 tablet 0   • amLODIPine (NORVASC) 10 MG tablet Take 1 tablet by mouth Daily.     • buPROPion XL (WELLBUTRIN XL) 150 MG 24 hr tablet Take 1 tablet by mouth Daily.     • citalopram (CeleXA) 40 MG tablet Take 1 tablet by mouth Daily.     • Continuous Blood Gluc  (Dexcom G6 ) device Daily.     • Continuous Blood Gluc Sensor (Dexcom G6 Sensor) CHANGE EVERY 10 DAYS     • Continuous Blood Gluc Transmit (Dexcom G6 Transmitter) misc CHANGE EVERY 90 DAYS     • Cyanocobalamin (VITAMIN B 12 PO) Take 1,000 mg by mouth Daily.     • losartan (COZAAR) 100 MG tablet Take 1 tablet by mouth Daily.     • meloxicam (MOBIC) 15 MG tablet TAKE 1 Tablet BY MOUTH ONCE DAILY WITH FOOD. STOP IF YOU HAVE UPSET STOMACH/STOMACH IRRITATION. 30 tablet 3   • propranolol LA (INDERAL LA) 160 MG 24 hr capsule Take 1 capsule by mouth 2 (Two) Times a Day.     • Semaglutide  (OZEMPIC, 2 MG/DOSE, SC) Inject 2 mg under the skin into the appropriate area as directed 1 (One) Time Per Week.     • spironolactone (ALDACTONE) 100 MG tablet Take 1 tablet by mouth Daily.     • aspirin 81 MG EC tablet Take 1 tablet by mouth Daily. 90 tablet 3   • dicyclomine (BENTYL) 10 MG capsule Take 1 capsule by mouth 2 (Two) Times a Day. 60 capsule 6   • rosuvastatin (CRESTOR) 10 MG tablet Take 1 tablet by mouth Daily. 30 tablet 11     No current facility-administered medications for this visit.           ALLERGIES:  Patient has no known allergies.    Past Medical History:   Diagnosis Date   • Arthritis of neck April 2022    Diagnosed by chiropractor   • Diabetes    • Hypertension    • Knee swelling Over 5 years ago       Social History     Tobacco Use   Smoking Status Never   Smokeless Tobacco Never          Family History   Problem Relation Age of Onset   • Stroke Mother    • Osteoarthritis Mother    • Hypertension Mother    • Cancer Mother         Skin cancer   • Atrial fibrillation Mother    • Cancer Father         Tonsil/agent orange   • Clotting disorder Maternal Aunt         Acquired/Not genetic       Review of Systems   Constitutional: Positive for malaise/fatigue. Negative for decreased appetite.   HENT:  Negative for congestion and sore throat.    Eyes:  Negative for blurred vision, double vision and visual disturbance.   Cardiovascular:  Positive for chest pain. Negative for near-syncope.   Respiratory:  Negative for shortness of breath and snoring.    Endocrine: Negative for cold intolerance and heat intolerance.   Hematologic/Lymphatic: Negative for adenopathy. Does not bruise/bleed easily.   Skin:  Negative for itching, nail changes and skin cancer.   Musculoskeletal:  Negative for arthritis and myalgias.   Gastrointestinal:  Negative for abdominal pain, dysphagia and heartburn.   Genitourinary:  Negative for bladder incontinence and frequency.   Neurological:  Negative for dizziness, seizures  "and vertigo.   Psychiatric/Behavioral:  Negative for altered mental status.    Allergic/Immunologic: Negative for environmental allergies and hives.     Diabetes- Yes  Thyroid- normal    Objective    /84 (BP Location: Right arm, Patient Position: Sitting)   Pulse 74   Ht 165.1 cm (65\")   Wt 117 kg (258 lb 9.6 oz)   BMI 43.03 kg/m²     Vitals and nursing note reviewed.   Constitutional:       Appearance: Healthy appearance. Not in distress.   Eyes:      Conjunctiva/sclera: Conjunctivae normal.      Pupils: Pupils are equal, round, and reactive to light.   HENT:      Head: Normocephalic.   Pulmonary:      Effort: Pulmonary effort is normal.      Breath sounds: Normal breath sounds.   Cardiovascular:      PMI at left midclavicular line. Normal rate. Regular rhythm.      Murmurs: There is a grade 3/6 high frequency blowing holosystolic murmur at the apex.   Abdominal:      General: Bowel sounds are normal.      Palpations: Abdomen is soft.   Musculoskeletal: Normal range of motion.      Cervical back: Normal range of motion and neck supple. Skin:     General: Skin is warm and dry.   Neurological:      Mental Status: Alert, oriented to person, place, and time and oriented to person, place and time.       ECG 12 Lead    Date/Time: 8/21/2024 12:58 PM  Performed by: Sona Maldonado MD    Authorized by: Sona Maldonado MD  Previous ECG: no previous ECG available  Rhythm: sinus rhythm  Rate: normal  Q waves: V1 and V2    QRS axis: normal  Other findings: left ventricular hypertrophy    Clinical impression: abnormal EKG        @ASSESSMENT/PLAN@        Diagnoses and all orders for this visit:    1. Chest pressure (Primary)  -     Stress Test With Myocardial Perfusion One Day; Future  -     Adult Transthoracic Echo Complete W/ Cont if Necessary Per Protocol; Future  -     High Sensitivity CRP; Future  -     dicyclomine (BENTYL) 10 MG capsule; Take 1 capsule by mouth 2 (Two) Times a Day.  Dispense: 60 capsule; " Refill: 6  -     aspirin 81 MG EC tablet; Take 1 tablet by mouth Daily.  Dispense: 90 tablet; Refill: 3    2. Primary hypertension    3. Hypercholesterolemia  -     rosuvastatin (CRESTOR) 10 MG tablet; Take 1 tablet by mouth Daily.  Dispense: 30 tablet; Refill: 11    4. Type 2 diabetes mellitus without complication, without long-term current use of insulin    5. Morbid obesity with BMI of 40.0-44.9, adult  -     Overnight Sleep Oximetry Study; Future    6. Chronic fatigue  -     Adult Transthoracic Echo Complete W/ Cont if Necessary Per Protocol; Future  -     Overnight Sleep Oximetry Study; Future    7. Abnormal EKG    8. B12 deficiency  -     Vitamin B12; Future    At baseline, her heart rate and blood pressure appears stable.  Her EKG shows normal sinus rhythm, LVH and Q waves in the anteroseptal leads.  Her clinical examination reveals BMI of 43.  Her cardiovascular examination reveals 3/6 systolic murmur at the mitral area.    Regarding the chest pressure which is bothering her the most, I explained to her that she does have risk factor for coronary artery disease but the timing of the discomfort starting around May makes side effect of high-dose of Ozempic.  At this time I scheduled her to undergo a stress test in the form of modified Naveen.  I advised her to check a CRP level.  I also advised her to be on aspirin 81 mg once a day.  I started her on Bentyl at 10 mg twice a day to see whether it is effective, if the discomfort is due to medication.    Regarding her hypertension, she is on multiple medication which includes amlodipine, Aldactone, Inderal LA and Cozaar.  At this time I did not change any medications.  If the blood pressure continues to be a problem, she may need an ultrasound of the renal artery    Regarding her hypercholesterolemia, given her risk factors I like to keep the LDL around 70 or less.  I started her on Crestor 10 mg once a day and will recheck it in 6 months    Regarding her  diabetes, she is on Ozempic.  Need to check the A1c level    Regarding her chronic fatigue and the obesity, need to rule out sleep apnea also.  I advised her to have the nocturnal pulse pO2 measurement done.  If it is abnormal she may need sleep study    Regarding the abnormal EKG, need to rule out an old infarct.  The stress test and echocardiogram should be able to answer that    Regarding her history of B12 deficiency, I advised her to start taking B12 as a sublingual.  Like to recheck the level again    Based on the results, further recommendations will be made.               Electronically signed by Sona Maldonado MD August 21, 2024 12:58 EDT

## 2024-08-27 ENCOUNTER — OUTSIDE FACILITY SERVICE (OUTPATIENT)
Dept: CARDIOLOGY | Facility: CLINIC | Age: 49
End: 2024-08-27
Payer: COMMERCIAL

## 2024-08-27 PROCEDURE — 78452 HT MUSCLE IMAGE SPECT MULT: CPT | Performed by: INTERNAL MEDICINE

## 2024-08-27 PROCEDURE — 93018 CV STRESS TEST I&R ONLY: CPT | Performed by: INTERNAL MEDICINE

## 2024-08-27 PROCEDURE — 93306 TTE W/DOPPLER COMPLETE: CPT | Performed by: INTERNAL MEDICINE

## 2024-08-30 ENCOUNTER — TELEPHONE (OUTPATIENT)
Dept: CARDIOLOGY | Facility: CLINIC | Age: 49
End: 2024-08-30
Payer: COMMERCIAL

## 2024-08-30 NOTE — TELEPHONE ENCOUNTER
Patient called asking for results of stress and echo. Patient was made aware that we have not received, but that I would obtain and have them scanned in for Dr. Maldonado to review and then we would call with results.     Patient states that she has still been having chest pain. States that it is constant, but does increase with stress. She states that she does have some arm pain with some tingling in her hand. Patient was made aware to go to the ER. She was made aware that any new or worsening symptoms, it is best to go to the ER to be evaluated.

## 2024-09-04 ENCOUNTER — TELEPHONE (OUTPATIENT)
Dept: CARDIOLOGY | Facility: CLINIC | Age: 49
End: 2024-09-04
Payer: COMMERCIAL

## 2024-09-04 DIAGNOSIS — R07.89 CHEST PRESSURE: Primary | ICD-10-CM

## 2024-09-04 DIAGNOSIS — R94.39 ABNORMAL NUCLEAR STRESS TEST: ICD-10-CM

## 2024-09-04 DIAGNOSIS — I10 PRIMARY HYPERTENSION: ICD-10-CM

## 2024-09-04 NOTE — TELEPHONE ENCOUNTER
----- Message from Sona Maldonado sent at 9/3/2024  4:57 PM EDT -----  Cardiac cath with renal angiogram

## 2024-09-11 ENCOUNTER — OUTSIDE FACILITY SERVICE (OUTPATIENT)
Dept: CARDIOLOGY | Facility: CLINIC | Age: 49
End: 2024-09-11
Payer: COMMERCIAL

## 2024-10-29 ENCOUNTER — TELEPHONE (OUTPATIENT)
Dept: CARDIOLOGY | Facility: CLINIC | Age: 49
End: 2024-10-29

## 2024-10-29 NOTE — TELEPHONE ENCOUNTER
Hub staff attempted to follow warm transfer process and was unsuccessful     Caller: Luz Oseguera    Relationship to patient: Self    Best call back number: [phone] (home)     Patient is needing: PT IS UNABLE TO MAKE APPT SCHEDULED FOR 10.29.24. THIS IS A HOSPITAL F/U APPT. PLEASE CALL PT WHEN AVAILABLE TO R/S THIS APPT.

## 2024-11-26 ENCOUNTER — OFFICE VISIT (OUTPATIENT)
Dept: CARDIOLOGY | Facility: CLINIC | Age: 49
End: 2024-11-26
Payer: COMMERCIAL

## 2024-11-26 VITALS
HEART RATE: 70 BPM | HEIGHT: 65 IN | DIASTOLIC BLOOD PRESSURE: 76 MMHG | BODY MASS INDEX: 43.03 KG/M2 | SYSTOLIC BLOOD PRESSURE: 112 MMHG

## 2024-11-26 DIAGNOSIS — R07.89 CHEST PRESSURE: ICD-10-CM

## 2024-11-26 DIAGNOSIS — I10 PRIMARY HYPERTENSION: Primary | ICD-10-CM

## 2024-11-26 DIAGNOSIS — E11.9 TYPE 2 DIABETES MELLITUS WITHOUT COMPLICATION, WITHOUT LONG-TERM CURRENT USE OF INSULIN: ICD-10-CM

## 2024-11-26 DIAGNOSIS — E66.01 MORBID OBESITY WITH BMI OF 40.0-44.9, ADULT: ICD-10-CM

## 2024-11-26 DIAGNOSIS — E78.00 HYPERCHOLESTEROLEMIA: ICD-10-CM

## 2024-11-26 DIAGNOSIS — I34.0 NONRHEUMATIC MITRAL VALVE REGURGITATION: ICD-10-CM

## 2024-11-26 DIAGNOSIS — G47.34 NOCTURNAL OXYGEN DESATURATION: ICD-10-CM

## 2024-11-26 RX ORDER — DICYCLOMINE HYDROCHLORIDE 10 MG/1
10 CAPSULE ORAL 2 TIMES DAILY
Qty: 180 CAPSULE | Refills: 3 | Status: SHIPPED | OUTPATIENT
Start: 2024-11-26

## 2024-11-26 NOTE — PROGRESS NOTES
Chief Complaint   Patient presents with    Hospital Follow Up Visit     Post cardiac cath . Patient feels chest tightness and SOA with exertion but soon resolves after rest.  Is improved since cath    LABS     Had labs [facility] 9-    Med Refill     Needs refills on Bentyl 90  day supply to [facility]       Subjective       Luz Oseguera is a 49 y.o. female initially seen August 2020 for for cardiac consultation.  She has HTN, DM, obesity.  Her symptoms included chest pressure and increasing fatigue.  Stress test was mildly abnormal and cardiac catheterization recommended.  Results showed normal coronaries with hypertensive heart disease including mild LV dysfunction and mild to moderate MR.    Today she returns to the office for a follow-up visit.  With more aggressive medication management her blood pressure is better controlled.  Her symptoms have improved, only with strenuous exertion she developed some chest tightness and shortness of breath but resolves with rest.    Cardiac History:    Past Surgical History:   Procedure Laterality Date    CARDIAC CATHETERIZATION  09/11/2024    Normal Coronaries & Renals. EF 50%. Mild-Mod MR    CARDIOVASCULAR STRESS TEST  08/27/2024    @ [facility]. 5.31 Min. 7.0 METS. 70% THR. 180/96. EF 47%R/O Anteroseptal Infarct    ECHO - CONVERTED  08/27/2024    @ [facility]. TLS. EF 50%. Mild MR. RVSP- 37 mmHg    OTHER SURGICAL HISTORY  08/26/2024    Pulse O2- Abnormal    SHOULDER SURGERY Left     TOTAL KNEE ARTHROPLASTY Left 05/04/2022    Procedure: TOTAL KNEE ARTHROPLASTY- LEFT;  Surgeon: Rolo Vale MD;  Location: [facility];  Service: Orthopedics;  Laterality: Left;       Current Outpatient Medications   Medication Sig Dispense Refill    acetaminophen (TYLENOL) 325 MG tablet Take 2 tablets by mouth Every 6 (Six) Hours As Needed for Mild Pain . 60 tablet 0    amLODIPine (NORVASC) 10 MG tablet Take 1 tablet by mouth Daily.      aspirin 81 MG EC tablet Take 1 tablet by mouth  Daily. 90 tablet 3    citalopram (CeleXA) 40 MG tablet Take 1 tablet by mouth Daily.      Continuous Blood Gluc Sensor (Dexcom G6 Sensor) CHANGE EVERY 10 DAYS      Continuous Blood Gluc Transmit (Dexcom G6 Transmitter) misc CHANGE EVERY 90 DAYS      dicyclomine (BENTYL) 10 MG capsule Take 1 capsule by mouth 2 (Two) Times a Day. 180 capsule 3    losartan (COZAAR) 100 MG tablet Take 1 tablet by mouth Daily.      meloxicam (MOBIC) 15 MG tablet TAKE 1 Tablet BY MOUTH ONCE DAILY WITH FOOD. STOP IF YOU HAVE UPSET STOMACH/STOMACH IRRITATION. 30 tablet 3    propranolol LA (INDERAL LA) 160 MG 24 hr capsule Take 1 capsule by mouth 2 (Two) Times a Day.      rosuvastatin (CRESTOR) 10 MG tablet Take 1 tablet by mouth Daily. 30 tablet 11    Semaglutide (OZEMPIC, 2 MG/DOSE, SC) Inject 2 mg under the skin into the appropriate area as directed 1 (One) Time Per Week.      spironolactone (ALDACTONE) 100 MG tablet Take 1 tablet by mouth Daily.       No current facility-administered medications for this visit.       Patient has no known allergies.    Past Medical History:   Diagnosis Date    Arthritis of neck April 2022    Diagnosed by chiropractor    Diabetes     Hypertension     Knee swelling Over 5 years ago       Social History     Socioeconomic History    Marital status:    Tobacco Use    Smoking status: Never    Smokeless tobacco: Never   Vaping Use    Vaping status: Never Used   Substance and Sexual Activity    Alcohol use: No    Drug use: No    Sexual activity: Yes     Partners: Male     Birth control/protection: None       Family History   Problem Relation Age of Onset    Stroke Mother     Osteoarthritis Mother     Hypertension Mother     Cancer Mother         Skin cancer    Atrial fibrillation Mother     Cancer Father         Tonsil/agent orange    Clotting disorder Maternal Aunt         Acquired/Not genetic       Review of Systems   Constitutional: Positive for malaise/fatigue (Improved).   Cardiovascular:  Positive  "for chest pain (Improved). Negative for irregular heartbeat, near-syncope and palpitations.   Respiratory:  Positive for shortness of breath and sleep disturbances due to breathing.    Hematologic/Lymphatic: Negative for bleeding problem.   Neurological:  Negative for dizziness and weakness.   Psychiatric/Behavioral:  Negative for memory loss. The patient is not nervous/anxious.         BP Readings from Last 5 Encounters:   11/26/24 112/76   08/21/24 130/84   06/13/23 (!) 160/120   11/08/22 120/76   09/27/22 150/100       Wt Readings from Last 5 Encounters:   08/21/24 117 kg (258 lb 9.6 oz)   06/13/23 122 kg (269 lb)   11/08/22 125 kg (275 lb)   09/27/22 124 kg (273 lb)   08/09/22 118 kg (260 lb 2.3 oz)       Objective     /76 (BP Location: Left arm, Patient Position: Sitting, Cuff Size: Adult)   Pulse 70   Ht 165.1 cm (65\")   BMI 43.03 kg/m²     Vitals and nursing note reviewed.   Constitutional:       Appearance: Not in distress.   Eyes:      Conjunctiva/sclera: Conjunctivae normal.   HENT:      Head: Normocephalic.   Neck:      Vascular: No carotid bruit.   Pulmonary:      Effort: Pulmonary effort is normal.      Breath sounds: Normal breath sounds.   Cardiovascular:      PMI at left midclavicular line. Normal rate. Regular rhythm.      Murmurs: There is no murmur.   Edema:     Peripheral edema absent.   Abdominal:      General: Bowel sounds are normal.      Palpations: Abdomen is soft.   Musculoskeletal:      Cervical back: Neck supple. Skin:     General: Skin is warm and dry.   Neurological:      Mental Status: Alert, oriented to person, place, and time and oriented to person, place and time.          Procedures: None today         Assessment & Plan   Diagnoses and all orders for this visit:    1. Primary hypertension (Primary)  -     Ambulatory Referral to Pulmonology    2. Nonrheumatic mitral valve regurgitation    3. Nocturnal oxygen desaturation  -     Ambulatory Referral to Pulmonology    4. Chest " pressure  -     dicyclomine (BENTYL) 10 MG capsule; Take 1 capsule by mouth 2 (Two) Times a Day.  Dispense: 180 capsule; Refill: 3    5. Hypercholesterolemia    6. Type 2 diabetes mellitus without complication, without long-term current use of insulin    7. Morbid obesity with BMI of 40.0-44.9, adult      HTN  -Better controlled  -Continue spironolactone, propranolol LA, losartan, amlodipine    MR  -Mild to moderate per cardiac catheterization  -Clinically stable    Nocturnal oxygen desaturation  -203 desats, lowest SpO2 77%  -Referral made to pulmonologist with recommendation for sleep study  -Informational handout on sleep apnea provided    Chest pressure  -Continue dicyclomine, GERD diet    Hypercholesterolemia  -Continue Crestor  -Labs managed through PCP    DM  -Currently on Ozempic and uses Dexcom    Report of recent cardiac catheterization reviewed with patient.  Continue current cardiac plan of care.  Referral to pulmonologist for sleep study as noted above.  Routine follow-up visit scheduled.               Electronically signed by Aida Robins, LIS[place]TE,  November 26, 2024 16:20 EST    Dictated Utilizing Dragon Dictation: Part of this note may be an electronic transcription/translation of spoken language to printed text using the Dragon Dictation System.

## 2025-02-26 RX ORDER — LANSOPRAZOLE 30 MG/1
30 CAPSULE, DELAYED RELEASE ORAL DAILY
Qty: 30 CAPSULE | Refills: 5 | OUTPATIENT
Start: 2025-02-26

## 2025-03-10 ENCOUNTER — OFFICE VISIT (OUTPATIENT)
Dept: ORTHOPEDIC SURGERY | Facility: CLINIC | Age: 50
End: 2025-03-10
Payer: COMMERCIAL

## 2025-03-10 VITALS — BODY MASS INDEX: 42.98 KG/M2 | WEIGHT: 257.94 LBS | HEIGHT: 65 IN

## 2025-03-10 DIAGNOSIS — S92.309A CLOSED NON-PHYSEAL FRACTURE OF METATARSAL BONE, UNSPECIFIED LATERALITY, UNSPECIFIED METATARSAL, INITIAL ENCOUNTER: Primary | ICD-10-CM

## 2025-03-10 NOTE — PROGRESS NOTES
[facility] Orthopaedic Surgery Office Visit     Office Visit       Date: 03/10/2025   Patient Name: Luz Oseguera  MRN: [id number]  YOB: 1975    Referring Physician: No ref. provider found     Chief Complaint:   Chief Complaint   Patient presents with    Right Foot - Pain     Fall down stairs 03/06/2025       History of Present Illness: Luz Oseguera is a 49 y.o. female who is here today with right lateral foot pain.  Suffered mechanical fall on March 6 injuring foot.  Seen subsequently in urgent care and was placed in a short cam walking boot.  States pain and swelling have improved.  Pain worse with activity and better with rest.  Works in [employer].  Denies smoking.  No other complaints or sources of pain.    Subjective   Review of Systems: Review of Systems   Constitutional: Negative.  Negative for chills, fatigue and fever.   HENT: Negative.  Negative for congestion and dental problem.    Eyes: Negative.  Negative for blurred vision.   Respiratory: Negative.  Negative for shortness of breath.    Cardiovascular: Negative.  Negative for leg swelling.   Gastrointestinal: Negative.  Negative for abdominal pain.   Endocrine: Negative.  Negative for polyuria.   Genitourinary: Negative.  Negative for difficulty urinating.   Musculoskeletal:  Positive for arthralgias.   Skin: Negative.    Allergic/Immunologic: Negative.    Neurological: Negative.    Hematological: Negative.  Negative for adenopathy.   Psychiatric/Behavioral: Negative.  Negative for behavioral problems.         Past Medical History:   Past Medical History:   Diagnosis Date    Arthritis of neck April 2022    Diagnosed by chiropractor    Diabetes     Hypertension     Knee swelling Over 5 years ago       Past Surgical History:   Past Surgical History:   Procedure Laterality Date    CARDIAC CATHETERIZATION  09/11/2024    Normal Coronaries & Renals. EF 50%. Mild-Mod MR    CARDIOVASCULAR STRESS  TEST  08/27/2024    @ [facility]. 5.31 Min. 7.0 METS. 70% THR. 180/96. EF 47%R/O Anteroseptal Infarct    ECHO - CONVERTED  08/27/2024    @ [facility]. TLS. EF 50%. Mild MREyad RVSP- 37 mmHg    OTHER SURGICAL HISTORY  08/26/2024    Pulse O2- Abnormal    SHOULDER SURGERY Left     TOTAL KNEE ARTHROPLASTY Left 05/04/2022    Procedure: TOTAL KNEE ARTHROPLASTY- LEFT;  Surgeon: Rolo Vale MD;  Location: [facility];  Service: Orthopedics;  Laterality: Left;       Family History:   Family History   Problem Relation Age of Onset    Stroke Mother     Osteoarthritis Mother     Hypertension Mother     Cancer Mother         Skin cancer    Atrial fibrillation Mother     Cancer Father         Tonsil/agent orange    Clotting disorder Maternal Aunt         Acquired/Not genetic       Social History:   Social History     Socioeconomic History    Marital status:    Tobacco Use    Smoking status: Never    Smokeless tobacco: Never   Vaping Use    Vaping status: Never Used   Substance and Sexual Activity    Alcohol use: No    Drug use: No    Sexual activity: Yes     Partners: Male     Birth control/protection: None       Medications:   Current Outpatient Medications:     acetaminophen (TYLENOL) 325 MG tablet, Take 2 tablets by mouth Every 6 (Six) Hours As Needed for Mild Pain ., Disp: 60 tablet, Rfl: 0    amLODIPine (NORVASC) 10 MG tablet, Take 1 tablet by mouth Daily., Disp: , Rfl:     aspirin 81 MG EC tablet, Take 1 tablet by mouth Daily., Disp: 90 tablet, Rfl: 3    citalopram (CeleXA) 40 MG tablet, Take 1 tablet by mouth Daily., Disp: , Rfl:     Continuous Blood Gluc Sensor (Dexcom G6 Sensor), CHANGE EVERY 10 DAYS, Disp: , Rfl:     Continuous Blood Gluc Transmit (Dexcom G6 Transmitter) misc, CHANGE EVERY 90 DAYS, Disp: , Rfl:     dicyclomine (BENTYL) 10 MG capsule, Take 1 capsule by mouth 2 (Two) Times a Day., Disp: 180 capsule, Rfl: 3    losartan (COZAAR) 100 MG tablet, Take 1 tablet by mouth Daily., Disp: , Rfl:     meloxicam  "(MOBIC) 15 MG tablet, TAKE 1 Tablet BY MOUTH ONCE DAILY WITH FOOD. STOP IF YOU HAVE UPSET STOMACH/STOMACH IRRITATION., Disp: 30 tablet, Rfl: 3    propranolol LA (INDERAL LA) 160 MG 24 hr capsule, Take 1 capsule by mouth 2 (Two) Times a Day., Disp: , Rfl:     rosuvastatin (CRESTOR) 10 MG tablet, Take 1 tablet by mouth Daily., Disp: 30 tablet, Rfl: 11    Semaglutide (OZEMPIC, 2 MG/DOSE, SC), Inject 2 mg under the skin into the appropriate area as directed 1 (One) Time Per Week., Disp: , Rfl:     spironolactone (ALDACTONE) 100 MG tablet, Take 1 tablet by mouth Daily., Disp: , Rfl:     Allergies: No Known Allergies    I reviewed the patient's chief complaint, history of present illness, review of systems, past medical history, surgical history, family history, social history, medications and allergy list.     Objective    Vital Signs:   Vitals:    03/10/25 1310   Weight: 117 kg (257 lb 15 oz)   Height: 165.1 cm (65\")     Body mass index is 42.92 kg/m².    Ortho Exam:  right LE Foot and Ankle Exam:   Plantigrade foot. Neurological exam of the superficial peroneal, deep peroneal, plantar, sural and saphenous nerves demonstrates intact sensation and normal motor function.   10/10 sites felt on monofilament testing of foot.   There is good perfusion to the toes.   The skin is intact throughout the foot and ankle without ulceration.   Able to range ankle, subtalar joint, midfoot and toes however somewhat limited by pain.  There is tenderness to palpation most pronounced about the base of the fifth metatarsal with adjacent swelling and ecchymosis.      Results Review:   03/10/25 I have personally reviewed and interpreted the images from outside facility with the documented findings, right proximal fifth metatarsal fracture, right foot x-rays from [facility] dated March 7      Assessment / Plan    Assessment/Plan:   Diagnoses and all orders for this visit:    1. Closed non-physeal fracture of metatarsal bone, " unspecified laterality, unspecified metatarsal, initial encounter (Primary)      Discussed fracture (an injury with risk of long term functional impairment) with patient as well as treatment options at length. Decision regarding surgical intervention considered. Patient is not a candidate due to trial of nonoperative management. Also reviewed external note and imaging studies from March 7. Plan for non-operative management in a CAM walking boot.  Provided patient with tall cam walking boot and even up in clinic today.  Patient can WBAT in boot using assistive devices PRN. Encouraged to ice and elevate during recovery. Recommend Tylenol for pain. Follow up in clinic in 4 weeks for repeat xrays and re-evaluation.     Discussed with patient that a small percentage of patients with this injury go on to develop a malunion/nonunion. The likely recommended surgical procedure would be an ORIF with augmentation if that were to happen.  The risks of such a procedure would include but are not limited to infection, neurovascular damage, hardware failure, stiffness, etc.         Follow Up:   Return in about 4 weeks (around 4/7/2025) for F/u Recheck with images.      Osvaldo Griffith MD  [facility] Orthopedic Surgeon

## 2025-04-07 ENCOUNTER — OFFICE VISIT (OUTPATIENT)
Dept: ORTHOPEDIC SURGERY | Facility: CLINIC | Age: 50
End: 2025-04-07
Payer: COMMERCIAL

## 2025-04-07 VITALS — BODY MASS INDEX: 42.98 KG/M2 | HEIGHT: 65 IN | WEIGHT: 257.94 LBS

## 2025-04-07 DIAGNOSIS — Z09 FRACTURE FOLLOW-UP: Primary | ICD-10-CM

## 2025-04-07 PROCEDURE — 99213 OFFICE O/P EST LOW 20 MIN: CPT | Performed by: ORTHOPAEDIC SURGERY

## 2025-04-07 RX ORDER — SEMAGLUTIDE 2.68 MG/ML
INJECTION, SOLUTION SUBCUTANEOUS
COMMUNITY
Start: 2025-03-24

## 2025-04-07 NOTE — PROGRESS NOTES
"                          [facility] Orthopaedic Surgery Office Follow Up     Office Follow Up Visit     Date: 04/07/2025   Patient Name: Luz Oseguera  MRN: [id number]  YOB: 1975  Chief Complaint:   Chief Complaint   Patient presents with    Follow-up     1 month recheck- Closed non-physeal fracture of metatarsal bone, unspecified laterality, unspecified metatarsal; DOI: 03/06/2025     History of Present Illness:   Luz Oseguera is a 49 y.o. female who is here today for follow-up for her right proximal fifth metatarsal fracture.  Last seen in clinic on March 10.  Is weightbearing as tolerated in tall cam walking boot since last visit.  States pain and swelling have been improving.  Minimal discomfort with ambulating in boot today.  Is happy with her progress.  No new complaints.    Subjective   I reviewed the patient's chief complaint, history of present illness, review of systems, past medical history, surgical history, family history, social history, medications and allergy list   Objective    Vital Signs:   Vitals:    04/07/25 1105   Weight: 117 kg (257 lb 15 oz)   Height: 165.1 cm (65\")     Body mass index is 42.92 kg/m².    Ortho Exam:  right LE Foot and Ankle Exam:   Plantigrade foot. Neurological exam of the superficial peroneal, deep peroneal, plantar, sural and saphenous nerves demonstrates intact sensation and normal motor function.   There is good perfusion to the toes.   The skin is intact throughout the foot and ankle without ulceration.   Able to range ankle, subtalar joint, midfoot and toes.  There is minimal tenderness to palpation about the base of the fifth metatarsal with minimal adjacent swelling.     Results Review:  XR Foot 3+ View Right  Right Foot X-Ray 04/07/25   Indication: Pain  Views: 3 weight bearing , comparison to previous  Findings: xrays reviewed by me today in the office and show healing   proximal fifth metatarsal fracture with similar alignment to previous   "     Assessment / Plan    Assessment/Plan:   Diagnoses and all orders for this visit:    1. Fracture follow-up (Primary)  -     XR Foot 3+ View Right      Returns to clinic for continued conservative management of proximal fifth metatarsal fracture.  Provided with a postop shoe in clinic today.  Patient can wean out of tall cam walking boot into postop shoe as her symptoms continue to improve.  Once again counseled patient to avoid activities that cause pain at her fracture site.  Continue to ice and elevate during recovery.  Plan to see patient back in 1 month for repeat x-rays and reevaluation.  Counseled patient to contact the clinic sooner should any concerns arise.  Him happy with her progress.  Was a pleasure seeing her today.    Follow Up:   Return in about 1 month (around 5/7/2025) for F/u Recheck with images.      Osvaldo Griffith MD  [facility] Orthopedic Surgeon

## 2025-06-18 ENCOUNTER — OFFICE VISIT (OUTPATIENT)
Dept: CARDIOLOGY | Facility: CLINIC | Age: 50
End: 2025-06-18
Payer: COMMERCIAL

## 2025-06-18 VITALS
BODY MASS INDEX: 42.92 KG/M2 | SYSTOLIC BLOOD PRESSURE: 122 MMHG | HEART RATE: 68 BPM | HEIGHT: 65 IN | DIASTOLIC BLOOD PRESSURE: 80 MMHG

## 2025-06-18 DIAGNOSIS — E78.00 HYPERCHOLESTEROLEMIA: ICD-10-CM

## 2025-06-18 DIAGNOSIS — R07.89 CHEST PRESSURE: ICD-10-CM

## 2025-06-18 DIAGNOSIS — E11.9 TYPE 2 DIABETES MELLITUS WITHOUT COMPLICATION, WITHOUT LONG-TERM CURRENT USE OF INSULIN: ICD-10-CM

## 2025-06-18 DIAGNOSIS — I10 PRIMARY HYPERTENSION: Primary | ICD-10-CM

## 2025-06-18 DIAGNOSIS — G47.33 OSA (OBSTRUCTIVE SLEEP APNEA): ICD-10-CM

## 2025-06-18 DIAGNOSIS — I34.0 NONRHEUMATIC MITRAL VALVE REGURGITATION: ICD-10-CM

## 2025-06-18 RX ORDER — ASPIRIN 81 MG/1
81 TABLET ORAL DAILY
Qty: 90 TABLET | Refills: 4 | Status: SHIPPED | OUTPATIENT
Start: 2025-06-18

## 2025-06-18 RX ORDER — ROSUVASTATIN CALCIUM 10 MG/1
10 TABLET, COATED ORAL DAILY
Qty: 90 TABLET | Refills: 4 | Status: SHIPPED | OUTPATIENT
Start: 2025-06-18

## 2025-06-18 NOTE — PROGRESS NOTES
Chief Complaint   Patient presents with    Follow-up     Cardiac management    LABS     Had labs  June 5,2025 per PCP.  K+ was  elevated and PCP had decreased her aldactone and encouraged more water intake     Palpitations     Patient states  she had a few  palpitations and chest discomfort  after having  cardiac cath but has not had any since  then. She has taken a leave from work since June 2025 due to planning her daughters wedding. She feels her symptoms may be related to work .    Med Refill     Needs refills on rosuvastatin and Aspirin 90 day supply to [facility]. She was prescribed prevacid after cath but she has not been taking , do you want her  to start  at this time ?       Subjective       Luz Oseguera is a 49 y.o. female initially seen August 2020 for for cardiac consultation.  She has HTN, DM, obesity.  Her symptoms included chest pressure and increasing fatigue.  Stress test was mildly abnormal and cardiac catheterization recommended.  Results showed normal coronaries with hypertensive heart disease including mild LV dysfunction and mild to moderate MR. In 2024 she underwent sleep study with abnormal results but unable to tolerate CPAP therapy.    Today she returns to the office for a follow-up visit.  She denies recent chest pain and palpitations have significantly improved since on leave from work.  She feels stress strongly contributes her symptoms.  Blood pressure is stable.  Due to recent lab results showing increased potassium level the dose of Aldactone was decreased and advised to increase water intake.    Cardiac History:    Past Surgical History:   Procedure Laterality Date    CARDIAC CATHETERIZATION  09/11/2024    Normal Coronaries & Renals. EF 50%. Mild-Mod MR    CARDIOVASCULAR STRESS TEST  08/27/2024    @ [facility]. 5.31 Min. 7.0 METS. 70% THR. 180/96. EF 47%R/O Anteroseptal Infarct    ECHO - CONVERTED  08/27/2024    @ [facility]. TLS. EF 50%. Mild MR. RVSP- 37 mmHg    OTHER SURGICAL  HISTORY  08/26/2024    Pulse O2- Abnormal    SHOULDER SURGERY Left     TOTAL KNEE ARTHROPLASTY Left 05/04/2022    Procedure: TOTAL KNEE ARTHROPLASTY- LEFT;  Surgeon: Rolo Vale MD;  [facility];  Service: Orthopedics;  Laterality: Left;       Current Outpatient Medications   Medication Sig Dispense Refill    acetaminophen (TYLENOL) 325 MG tablet Take 2 tablets by mouth Every 6 (Six) Hours As Needed for Mild Pain . 60 tablet 0    amLODIPine (NORVASC) 10 MG tablet Take 1 tablet by mouth Daily.      aspirin 81 MG EC tablet Take 1 tablet by mouth Daily. 90 tablet 4    citalopram (CeleXA) 40 MG tablet Take 1 tablet by mouth Daily.      Continuous Blood Gluc Sensor (Dexcom G6 Sensor) CHANGE EVERY 10 DAYS      Continuous Blood Gluc Transmit (Dexcom G6 Transmitter) misc CHANGE EVERY 90 DAYS      losartan (COZAAR) 100 MG tablet Take 1 tablet by mouth Daily.      meloxicam (MOBIC) 15 MG tablet TAKE 1 Tablet BY MOUTH ONCE DAILY WITH FOOD. STOP IF YOU HAVE UPSET STOMACH/STOMACH IRRITATION. 30 tablet 3    Ozempic, 2 MG/DOSE, 8 MG/3ML solution pen-injector INJECT 2mg SUBCUTANEOUSLY once weekly FOR diabetes      propranolol LA (INDERAL LA) 160 MG 24 hr capsule Take 1 capsule by mouth 2 (Two) Times a Day.      rosuvastatin (CRESTOR) 10 MG tablet Take 1 tablet by mouth Daily. 90 tablet 4    spironolactone (ALDACTONE) 50 MG tablet Take 1 tablet by mouth Daily.       No current facility-administered medications for this visit.       Patient has no known allergies.    Past Medical History:   Diagnosis Date    Arthritis of neck April 2022    Diagnosed by chiropractor    Diabetes     Hypertension     Knee swelling Over 5 years ago       Social History     Socioeconomic History    Marital status:    Tobacco Use    Smoking status: Never    Smokeless tobacco: Never   Vaping Use    Vaping status: Never Used   Substance and Sexual Activity    Alcohol use: No    Drug use: No    Sexual activity: Yes     Partners:  "Male     Birth control/protection: None       Family History   Problem Relation Age of Onset    Stroke Mother     Osteoarthritis Mother     Hypertension Mother     Cancer Mother         Skin cancer    Atrial fibrillation Mother     Cancer Father         Tonsil/agent orange    Clotting disorder Maternal Aunt         Acquired/Not genetic       Review of Systems   Constitutional: Negative for diaphoresis, fever and malaise/fatigue.   Cardiovascular:  Negative for chest pain, leg swelling, near-syncope and palpitations.   Respiratory:  Positive for sleep disturbances due to breathing. Negative for cough.    Hematologic/Lymphatic: Negative for bleeding problem.   Neurological:  Negative for dizziness and headaches.        BP Readings from Last 5 Encounters:   06/18/25 122/80   11/26/24 112/76   08/21/24 130/84   06/13/23 (!) 160/120   11/08/22 120/76       Wt Readings from Last 5 Encounters:   04/07/25 117 kg (257 lb 15 oz)   03/10/25 117 kg (257 lb 15 oz)   08/21/24 117 kg (258 lb 9.6 oz)   06/13/23 122 kg (269 lb)   11/08/22 125 kg (275 lb)       Objective     /80 (BP Location: Left arm, Patient Position: Sitting, Cuff Size: Large Adult)   Pulse 68   Ht 165.1 cm (65\")   BMI 42.92 kg/m²     Vitals and nursing note reviewed.   Constitutional:       Appearance: Not in distress.   Eyes:      Conjunctiva/sclera: Conjunctivae normal.      Pupils: Pupils are equal, round, and reactive to light.   HENT:      Head: Normocephalic.   Neck:      Vascular: No carotid bruit.   Pulmonary:      Effort: Pulmonary effort is normal.      Breath sounds: Normal breath sounds.   Cardiovascular:      PMI at left midclavicular line. Normal rate. Regular rhythm.      Murmurs: There is no murmur.   Pulses:     Intact distal pulses.   Edema:     Peripheral edema absent.   Abdominal:      General: Bowel sounds are normal.      Palpations: Abdomen is soft.   Musculoskeletal:      Cervical back: Neck supple. Skin:     General: Skin is warm " and dry.   Neurological:      Mental Status: Alert, oriented to person, place, and time and oriented to person, place and time.          Procedures: None today         Assessment & Plan   Diagnoses and all orders for this visit:    1. Primary hypertension (Primary)    2. Nonrheumatic mitral valve regurgitation    3. Hypercholesterolemia  -     rosuvastatin (CRESTOR) 10 MG tablet; Take 1 tablet by mouth Daily.  Dispense: 90 tablet; Refill: 4    4. Type 2 diabetes mellitus without complication, without long-term current use of insulin    5. Chest pressure  -     aspirin 81 MG EC tablet; Take 1 tablet by mouth Daily.  Dispense: 90 tablet; Refill: 4    6. MYNOR (obstructive sleep apnea)      HTN  - Stable  -Continue spironolactone, propranolol LA, losartan, amlodipine     MR  -Mild to moderate per cardiac catheterization  -Clinically stable     Nocturnal oxygen desaturation  - overnight oxygen monitor results: 203 desats, lowest SpO2 77%  -Sleep study positive MYNOR, unable to tolerate CPAP  -Discussed Inspire as option if has worsening symptoms     Chest pressure  -Currently resolved  -Continue GERD management     Hypercholesterolemia  -Continue Crestor  -Labs managed through PCP     DM  -Currently on Ozempic and uses Dexcom  -Since starting Ozempic her weight is down about 20 pounds  -On statin, aspirin, ARB    Annual visit scheduled.           Electronically signed by LISSETTE Lopes,  June 18, 2025 13:00 EDT    Dictated Utilizing Dragon Dictation: Part of this note may be an electronic transcription/translation of spoken language to printed text using the Dragon Dictation System.

## 2025-07-25 DIAGNOSIS — R07.89 CHEST PRESSURE: ICD-10-CM

## 2025-07-25 RX ORDER — ASPIRIN 81 MG/1
81 TABLET, COATED ORAL DAILY
Qty: 90 TABLET | Refills: 3 | OUTPATIENT
Start: 2025-07-25

## (undated) DEVICE — TBG PENCL TELESCP MEGADYNE SMOKE EVAC 10FT

## (undated) DEVICE — UNDRPD COMFRT GLD DRYPAD 36X57IN

## (undated) DEVICE — SYR LUERLOK 30CC

## (undated) DEVICE — SUT MONOCRYL PLS ANTIB UND 3/0  PS1 27IN

## (undated) DEVICE — SHT AIR TRANSFR COMFRT GLIDE LAT 40X80IN

## (undated) DEVICE — ANTIBACTERIAL UNDYED BRAIDED (POLYGLACTIN 910), SYNTHETIC ABSORBABLE SUTURE: Brand: COATED VICRYL

## (undated) DEVICE — PUMP PAIN AUTOFUSER AUTO SELCT NOBOLUS 1TO14ML/HR 550ML DISP

## (undated) DEVICE — TRAP FLD MINIVAC MEGADYNE 100ML

## (undated) DEVICE — 3 BONE CEMENT MIXER: Brand: MIXEVAC

## (undated) DEVICE — PATIENT RETURN ELECTRODE, SINGLE-USE, CONTACT QUALITY MONITORING, ADULT, WITH 9FT CORD, FOR PATIENTS WEIGING OVER 33LBS. (15KG): Brand: MEGADYNE

## (undated) DEVICE — 450 ML BOTTLE OF 0.05% CHLORHEXIDINE GLUCONATE IN 99.95% STERILE WATER FOR IRRIGATION, USP AND APPLICATOR.: Brand: IRRISEPT ANTIMICROBIAL WOUND LAVAGE

## (undated) DEVICE — BNDG ELAS W/CLIP 6IN 10YD LF STRL

## (undated) DEVICE — PK KN TOTL 10

## (undated) DEVICE — TRY EPID SFTY 18G 3.5IN 1T7680

## (undated) DEVICE — STERILE PVP: Brand: MEDLINE INDUSTRIES, INC.

## (undated) DEVICE — GLV SURG PREMIERPRO MIC LTX PF SZ8 BRN

## (undated) DEVICE — UNDERCAST PADDING: Brand: DEROYAL

## (undated) DEVICE — BLANKT WARM UPPR/BDY ARM/OUT 57X196CM

## (undated) DEVICE — DRSNG PAD ABD 8X10IN STRL

## (undated) DEVICE — STRYKER PERFORMANCE SERIES SAGITTAL BLADE: Brand: STRYKER PERFORMANCE SERIES

## (undated) DEVICE — Device

## (undated) DEVICE — SYS CLS SKIN PREMIERPRO EXOFINFUSION 22CM

## (undated) DEVICE — PAD ARMBRD SURG CONVOL 7.5X20X2IN